# Patient Record
Sex: FEMALE | Race: WHITE | Employment: FULL TIME | URBAN - METROPOLITAN AREA
[De-identification: names, ages, dates, MRNs, and addresses within clinical notes are randomized per-mention and may not be internally consistent; named-entity substitution may affect disease eponyms.]

---

## 2022-01-18 ENCOUNTER — OFFICE VISIT (OUTPATIENT)
Dept: PRIMARY CARE CLINIC | Age: 22
End: 2022-01-18
Payer: COMMERCIAL

## 2022-01-18 VITALS
DIASTOLIC BLOOD PRESSURE: 105 MMHG | BODY MASS INDEX: 23.1 KG/M2 | HEART RATE: 111 BPM | SYSTOLIC BLOOD PRESSURE: 156 MMHG | WEIGHT: 114.6 LBS | RESPIRATION RATE: 16 BRPM | TEMPERATURE: 99.3 F | HEIGHT: 59 IN

## 2022-01-18 DIAGNOSIS — R51.9 NONINTRACTABLE HEADACHE, UNSPECIFIED CHRONICITY PATTERN, UNSPECIFIED HEADACHE TYPE: ICD-10-CM

## 2022-01-18 DIAGNOSIS — Z23 NEED FOR HPV VACCINATION: ICD-10-CM

## 2022-01-18 DIAGNOSIS — Z86.39 HISTORY OF VITAMIN D DEFICIENCY: ICD-10-CM

## 2022-01-18 DIAGNOSIS — Z12.4 CERVICAL CANCER SCREENING: ICD-10-CM

## 2022-01-18 DIAGNOSIS — Z76.89 ENCOUNTER TO ESTABLISH CARE: Primary | ICD-10-CM

## 2022-01-18 DIAGNOSIS — Z83.49 FAMILY HISTORY OF THYROID DISEASE: ICD-10-CM

## 2022-01-18 DIAGNOSIS — R03.0 ELEVATED BP WITHOUT DIAGNOSIS OF HYPERTENSION: ICD-10-CM

## 2022-01-18 PROCEDURE — 1036F TOBACCO NON-USER: CPT | Performed by: FAMILY MEDICINE

## 2022-01-18 PROCEDURE — 90651 9VHPV VACCINE 2/3 DOSE IM: CPT | Performed by: FAMILY MEDICINE

## 2022-01-18 PROCEDURE — G8427 DOCREV CUR MEDS BY ELIG CLIN: HCPCS | Performed by: FAMILY MEDICINE

## 2022-01-18 PROCEDURE — G8420 CALC BMI NORM PARAMETERS: HCPCS | Performed by: FAMILY MEDICINE

## 2022-01-18 PROCEDURE — 99203 OFFICE O/P NEW LOW 30 MIN: CPT | Performed by: FAMILY MEDICINE

## 2022-01-18 PROCEDURE — 90471 IMMUNIZATION ADMIN: CPT | Performed by: FAMILY MEDICINE

## 2022-01-18 PROCEDURE — G8484 FLU IMMUNIZE NO ADMIN: HCPCS | Performed by: FAMILY MEDICINE

## 2022-01-18 RX ORDER — DESOGESTREL AND ETHINYL ESTRADIOL 21-5 (28)
1 KIT ORAL DAILY
COMMUNITY
End: 2022-08-18

## 2022-01-18 ASSESSMENT — PATIENT HEALTH QUESTIONNAIRE - PHQ9
SUM OF ALL RESPONSES TO PHQ9 QUESTIONS 1 & 2: 0
SUM OF ALL RESPONSES TO PHQ QUESTIONS 1-9: 0
SUM OF ALL RESPONSES TO PHQ QUESTIONS 1-9: 0
1. LITTLE INTEREST OR PLEASURE IN DOING THINGS: 0
SUM OF ALL RESPONSES TO PHQ QUESTIONS 1-9: 0
2. FEELING DOWN, DEPRESSED OR HOPELESS: 0
SUM OF ALL RESPONSES TO PHQ QUESTIONS 1-9: 0

## 2022-01-18 ASSESSMENT — ENCOUNTER SYMPTOMS
SHORTNESS OF BREATH: 0
SINUS PRESSURE: 0
BACK PAIN: 0
SINUS PAIN: 0
BLOOD IN STOOL: 0
COUGH: 0
CHEST TIGHTNESS: 0
ABDOMINAL PAIN: 0
RHINORRHEA: 0
DIARRHEA: 0
SORE THROAT: 0
WHEEZING: 0
CONSTIPATION: 0
NAUSEA: 0
VOMITING: 0

## 2022-01-18 NOTE — PROGRESS NOTES
Chief Complaint   Patient presents with   [address]     would like labs dont thyroid and Vit D     Referral - General     GYN         ASSESSMENT/PLAN:  1. Encounter to establish care  VS reviewed     BMI reviewed   All questions answered. F/u discussed. Appropriate healthy lifestyle modifications discussed. - CBC Auto Differential; Future  - Comprehensive Metabolic Panel; Future    2. Family history of thyroid disease  Will update thyroid function tests and follow up accordingly   - TSH with Reflex; Future    3. History of vitamin D deficiency  Update, follow up and treat accordingly  - Vitamin D 25 Hydroxy; Future    4. Cervical cancer screening  Referral placed. - Evsin Banks MD, Gynecology, [facility]    5. Nonintractable headache, unspecified chronicity pattern, unspecified headache type  Odd in the setting of good nutrition, hydration. No trigger that she knows of,   Happens at rest or when she is relaxed  Usually resolve on own. May throb but encompasses forehead, BL. Follow up if returns. Shared decision to monitor - briefly discussed imaging, medications and conservative measures to work up/treat. 6. Elevated BP without diagnosis of hypertension  Will follow up in 4 weeks  May be causing headaches as above  If still elevated, may try to get some home recordings to decide whether or not to treat. Rule out thyroid abnormality. 7. Need for HPV vaccination  Administered. - HPV Vaccine 9-valent IM         HPI:  Radha Pfeiffer is a 19 y.o. (: 2000) here today to establish care. Family history of thyroid disease and low vitamin D. Would like both of these checked    Would like ref to gyn for cerv can screening. Last 4 months, shorter periods, about 2 days and very light. \"Brain freeze\" headache in forehead and felt like it was throbbing a little bit. Started a few weeks ago and happened a couple times a day, lasted 30 minutes and resolved.    Just chilling. No associated symptoms. States that her BP is usually high in doctors offices. No associated symptoms at this time. Would like to update her HPV vaccine. Review of Systems   Constitutional: Negative for activity change, appetite change, chills, fatigue, fever and unexpected weight change. HENT: Negative for congestion, postnasal drip, rhinorrhea, sinus pressure, sinus pain, sneezing and sore throat. Eyes: Negative for visual disturbance. Respiratory: Negative for cough, chest tightness, shortness of breath and wheezing. Cardiovascular: Negative for chest pain and palpitations. Gastrointestinal: Negative for abdominal pain, blood in stool, constipation, diarrhea, nausea and vomiting. Endocrine: Negative for cold intolerance, heat intolerance, polydipsia and polyuria. Genitourinary: Negative for dysuria, frequency, vaginal bleeding and vaginal discharge. Musculoskeletal: Negative for arthralgias, back pain, joint swelling, myalgias and neck pain. Skin: Negative for rash and wound. Allergic/Immunologic: Negative for environmental allergies. Neurological: Negative for dizziness, tremors, syncope, weakness, light-headedness, numbness and headaches. Hematological: Negative for adenopathy. Psychiatric/Behavioral: Negative for behavioral problems, decreased concentration, sleep disturbance and suicidal ideas. The patient is not nervous/anxious. History reviewed. No pertinent past medical history.     Family History   Problem Relation Age of Onset    Hyperthyroidism Mother     Diabetes Maternal Grandmother     Diabetes Maternal Grandfather     Diabetes Paternal Grandmother     Diabetes Paternal Grandfather        Social History     Tobacco Use    Smoking status: Never Smoker    Smokeless tobacco: Never Used   Substance Use Topics    Alcohol use: Not on file    Drug use: Not on file       New Prescriptions    No medications on file       Meds Prior to visit:  Current Outpatient Medications on File Prior to Visit   Medication Sig Dispense Refill    desogestrel-ethinyl estradiol (KARIVA) 0.15-0.02/0.01 MG (21/5) per tablet Take 1 tablet by mouth daily       No current facility-administered medications on file prior to visit. No Known Allergies    OBJECTIVE:  BP (!) 156/105   Pulse 111   Temp 99.3 °F (37.4 °C) (Temporal)   Resp 16   Ht 4' 11\" (1.499 m)   Wt 114 lb 9.6 oz (52 kg)   LMP 12/18/2021   Breastfeeding No   BMI 23.15 kg/m²   BP Readings from Last 2 Encounters:   01/18/22 (!) 156/105     Wt Readings from Last 3 Encounters:   01/18/22 114 lb 9.6 oz (52 kg)       Physical Exam  Vitals reviewed. Constitutional:       General: She is not in acute distress. Appearance: She is well-developed. HENT:      Head: Normocephalic and atraumatic. Right Ear: Tympanic membrane, ear canal and external ear normal. There is no impacted cerumen. Left Ear: Tympanic membrane, ear canal and external ear normal. There is no impacted cerumen. Mouth/Throat:      Mouth: Mucous membranes are moist.      Pharynx: Oropharynx is clear. No oropharyngeal exudate or posterior oropharyngeal erythema. Eyes:      General: No scleral icterus. Right eye: No discharge. Left eye: No discharge. Conjunctiva/sclera: Conjunctivae normal.      Pupils: Pupils are equal, round, and reactive to light. Cardiovascular:      Rate and Rhythm: Normal rate and regular rhythm. Heart sounds: Normal heart sounds. No murmur heard. Comments: Radial and pedal pulses intact  Pulmonary:      Effort: Pulmonary effort is normal. No respiratory distress. Breath sounds: Normal breath sounds. No wheezing or rales. Chest:      Chest wall: No tenderness. Abdominal:      General: Bowel sounds are normal.      Palpations: Abdomen is soft. Tenderness: There is no abdominal tenderness. There is no guarding. Comments: Normal liver and spleen. No organomegaly   Musculoskeletal:         General: No tenderness. Normal range of motion. Cervical back: Normal range of motion and neck supple. Comments: Intact in all extremities   Lymphadenopathy:      Cervical: No cervical adenopathy. Skin:     General: Skin is warm. Findings: No erythema or rash. Neurological:      General: No focal deficit present. Mental Status: She is alert and oriented to person, place, and time. Mental status is at baseline. Motor: No weakness or abnormal muscle tone. Coordination: Coordination normal.      Gait: Gait normal.      Deep Tendon Reflexes: Reflexes normal.   Psychiatric:         Mood and Affect: Mood normal.         Behavior: Behavior normal.         Thought Content: Thought content normal.         Judgment: Judgment normal.         Discussed use, benefit, and side effects of prescribed medications. Barriers to medication compliance addressed. All patient questions answered. Pt voiced understanding. RTC Return if symptoms worsen or fail to improve.     Future Appointments   Date Time Provider Seble Massey   2/18/2022  8:00 AM MD Radha Ross MD  1/18/2022  5:18 PM

## 2022-01-21 DIAGNOSIS — Z86.39 HISTORY OF VITAMIN D DEFICIENCY: Primary | ICD-10-CM

## 2022-01-21 DIAGNOSIS — Z76.89 ENCOUNTER TO ESTABLISH CARE: ICD-10-CM

## 2022-01-21 DIAGNOSIS — Z83.49 FAMILY HISTORY OF THYROID DISEASE: ICD-10-CM

## 2022-01-21 DIAGNOSIS — Z86.39 HISTORY OF VITAMIN D DEFICIENCY: ICD-10-CM

## 2022-01-21 LAB
A/G RATIO: 1.7 (ref 1.1–2.2)
ALBUMIN SERPL-MCNC: 4.5 G/DL (ref 3.4–5)
ALP BLD-CCNC: 52 U/L (ref 40–129)
ALT SERPL-CCNC: 7 U/L (ref 10–40)
ANION GAP SERPL CALCULATED.3IONS-SCNC: 14 MMOL/L (ref 3–16)
AST SERPL-CCNC: 19 U/L (ref 15–37)
BASOPHILS ABSOLUTE: 0 K/UL (ref 0–0.2)
BASOPHILS RELATIVE PERCENT: 0.5 %
BILIRUB SERPL-MCNC: 0.3 MG/DL (ref 0–1)
BUN BLDV-MCNC: 10 MG/DL (ref 7–20)
CALCIUM SERPL-MCNC: 9.8 MG/DL (ref 8.3–10.6)
CHLORIDE BLD-SCNC: 105 MMOL/L (ref 99–110)
CO2: 23 MMOL/L (ref 21–32)
CREAT SERPL-MCNC: 0.6 MG/DL (ref 0.6–1.1)
EOSINOPHILS ABSOLUTE: 0.1 K/UL (ref 0–0.6)
EOSINOPHILS RELATIVE PERCENT: 2.5 %
GFR AFRICAN AMERICAN: >60
GFR NON-AFRICAN AMERICAN: >60
GLUCOSE BLD-MCNC: 89 MG/DL (ref 70–99)
HCT VFR BLD CALC: 39.2 % (ref 36–48)
HEMOGLOBIN: 13.2 G/DL (ref 12–16)
LYMPHOCYTES ABSOLUTE: 1.7 K/UL (ref 1–5.1)
LYMPHOCYTES RELATIVE PERCENT: 32.6 %
MCH RBC QN AUTO: 29.9 PG (ref 26–34)
MCHC RBC AUTO-ENTMCNC: 33.7 G/DL (ref 31–36)
MCV RBC AUTO: 88.7 FL (ref 80–100)
MONOCYTES ABSOLUTE: 0.5 K/UL (ref 0–1.3)
MONOCYTES RELATIVE PERCENT: 9 %
NEUTROPHILS ABSOLUTE: 2.9 K/UL (ref 1.7–7.7)
NEUTROPHILS RELATIVE PERCENT: 55.4 %
PDW BLD-RTO: 13.1 % (ref 12.4–15.4)
PLATELET # BLD: 371 K/UL (ref 135–450)
PMV BLD AUTO: 7 FL (ref 5–10.5)
POTASSIUM SERPL-SCNC: 4.4 MMOL/L (ref 3.5–5.1)
RBC # BLD: 4.42 M/UL (ref 4–5.2)
SODIUM BLD-SCNC: 142 MMOL/L (ref 136–145)
TOTAL PROTEIN: 7.1 G/DL (ref 6.4–8.2)
TSH REFLEX: 2.43 UIU/ML (ref 0.27–4.2)
VITAMIN D 25-HYDROXY: 26.4 NG/ML
WBC # BLD: 5.2 K/UL (ref 4–11)

## 2022-01-21 RX ORDER — MELATONIN
1000 DAILY
Qty: 90 TABLET | Refills: 1 | Status: SHIPPED | OUTPATIENT
Start: 2022-01-21

## 2022-02-02 ENCOUNTER — OFFICE VISIT (OUTPATIENT)
Dept: PRIMARY CARE CLINIC | Age: 22
End: 2022-02-02
Payer: COMMERCIAL

## 2022-02-02 VITALS
HEART RATE: 117 BPM | DIASTOLIC BLOOD PRESSURE: 89 MMHG | BODY MASS INDEX: 23.1 KG/M2 | HEIGHT: 59 IN | SYSTOLIC BLOOD PRESSURE: 152 MMHG | RESPIRATION RATE: 16 BRPM | TEMPERATURE: 98.2 F | WEIGHT: 114.6 LBS

## 2022-02-02 DIAGNOSIS — R51.9 NONINTRACTABLE HEADACHE, UNSPECIFIED CHRONICITY PATTERN, UNSPECIFIED HEADACHE TYPE: ICD-10-CM

## 2022-02-02 DIAGNOSIS — I10 BENIGN ESSENTIAL HTN: Primary | ICD-10-CM

## 2022-02-02 PROCEDURE — G8427 DOCREV CUR MEDS BY ELIG CLIN: HCPCS | Performed by: FAMILY MEDICINE

## 2022-02-02 PROCEDURE — 99214 OFFICE O/P EST MOD 30 MIN: CPT | Performed by: FAMILY MEDICINE

## 2022-02-02 PROCEDURE — G8420 CALC BMI NORM PARAMETERS: HCPCS | Performed by: FAMILY MEDICINE

## 2022-02-02 PROCEDURE — 1036F TOBACCO NON-USER: CPT | Performed by: FAMILY MEDICINE

## 2022-02-02 PROCEDURE — G8484 FLU IMMUNIZE NO ADMIN: HCPCS | Performed by: FAMILY MEDICINE

## 2022-02-02 RX ORDER — AMLODIPINE BESYLATE 5 MG/1
5 TABLET ORAL DAILY
Qty: 30 TABLET | Refills: 3 | Status: SHIPPED | OUTPATIENT
Start: 2022-02-02 | End: 2022-02-07 | Stop reason: SDUPTHER

## 2022-02-02 ASSESSMENT — ENCOUNTER SYMPTOMS
COUGH: 0
CONSTIPATION: 0
DIARRHEA: 0
EYE PAIN: 0
SHORTNESS OF BREATH: 0
ABDOMINAL PAIN: 0

## 2022-02-02 NOTE — PROGRESS NOTES
Chief Complaint   Patient presents with   Eusebia Draft Establish Care         ASSESSMENT/PLAN:  1. Benign essential HTN  Will start amlodipine 5 mg daily  Follow up in 2-4 weeks to gauge improvement  Labs reviewed and WNL  Denies CP or pressure  Will consider ECHO in the near future if possible murmur still heard on physical at follow up  Provided dash diet info  - amLODIPine (NORVASC) 5 MG tablet; Take 1 tablet by mouth daily  Dispense: 30 tablet; Refill: 3    2. Nonintractable headache, unspecified chronicity pattern, unspecified headache type  See if antihypertensive is going to help headaches given elevated Bps in and out of office. Stress level controlled  Follow up in 2-4 weeks to gauge improvement with BP mgmt. HPI:  Shannan Badillo is a 19 y.o. (: 2000) here today for blood pressure management and headache discussion. Has checked BP in pharmacy and it is 150s-160s/90s-100s. No meds. No change in diet but already does eat healthily and exercise. Headaches are almost daily and last for about 1-2 hours. Back of head. No throbbing or pulsing. No hearing or vision changes. Has not tried any meds. Maintains good hydration. Review of Systems   Constitutional: Negative for appetite change, chills, fatigue and fever. HENT: Negative for congestion. Eyes: Negative for pain and visual disturbance. Respiratory: Negative for cough and shortness of breath. Cardiovascular: Negative for chest pain and palpitations. Gastrointestinal: Negative for abdominal pain, constipation and diarrhea. Genitourinary: Negative for difficulty urinating. Musculoskeletal: Negative for arthralgias. Skin: Negative for rash and wound. Neurological: Positive for headaches. Negative for dizziness, weakness and light-headedness. Back of head, tension like. No pulsing. Hematological: Does not bruise/bleed easily. Psychiatric/Behavioral: Negative for behavioral problems. History reviewed.  No pertinent past medical history. Family History   Problem Relation Age of Onset    Hyperthyroidism Mother     Diabetes Maternal Grandmother     Diabetes Maternal Grandfather     Diabetes Paternal Grandmother     Diabetes Paternal Grandfather        Social History     Tobacco Use    Smoking status: Never Smoker    Smokeless tobacco: Never Used   Substance Use Topics    Alcohol use: Not on file    Drug use: Not on file       New Prescriptions    AMLODIPINE (NORVASC) 5 MG TABLET    Take 1 tablet by mouth daily       Meds Prior to visit:  Current Outpatient Medications on File Prior to Visit   Medication Sig Dispense Refill    vitamin D3 (CHOLECALCIFEROL) 25 MCG (1000 UT) TABS tablet Take 1 tablet by mouth daily 90 tablet 1    desogestrel-ethinyl estradiol (KARIVA) 0.15-0.02/0.01 MG (21/5) per tablet Take 1 tablet by mouth daily       No current facility-administered medications on file prior to visit. No Known Allergies    OBJECTIVE:  BP (!) 152/89   Pulse 117   Temp 98.2 °F (36.8 °C) (Temporal)   Resp 16   Ht 4' 11\" (1.499 m)   Wt 114 lb 9.6 oz (52 kg)   LMP 01/12/2022   Breastfeeding No   BMI 23.15 kg/m²   BP Readings from Last 2 Encounters:   02/02/22 (!) 152/89   01/18/22 (!) 156/105     Wt Readings from Last 3 Encounters:   02/02/22 114 lb 9.6 oz (52 kg)   01/18/22 114 lb 9.6 oz (52 kg)       Physical Exam  Vitals reviewed. Constitutional:       General: She is not in acute distress. Appearance: She is well-developed. HENT:      Head: Normocephalic and atraumatic. Right Ear: Tympanic membrane, ear canal and external ear normal. There is no impacted cerumen. Left Ear: Tympanic membrane, ear canal and external ear normal. There is no impacted cerumen. Mouth/Throat:      Mouth: Mucous membranes are moist.      Pharynx: Oropharynx is clear. No oropharyngeal exudate or posterior oropharyngeal erythema. Eyes:      General: No scleral icterus.         Right eye: No discharge. Left eye: No discharge. Conjunctiva/sclera: Conjunctivae normal.      Pupils: Pupils are equal, round, and reactive to light. Cardiovascular:      Rate and Rhythm: Normal rate and regular rhythm. Pulses: Normal pulses. Heart sounds: Murmur heard. Comments: Radial and pedal pulses intact. May be a systolic murmur but hard to discern. Pulmonary:      Effort: Pulmonary effort is normal. No respiratory distress. Breath sounds: Normal breath sounds. No wheezing or rales. Chest:      Chest wall: No tenderness. Abdominal:      General: Bowel sounds are normal.      Palpations: Abdomen is soft. Tenderness: There is no abdominal tenderness. There is no guarding. Comments: Normal liver and spleen. No organomegaly   Musculoskeletal:         General: No tenderness. Normal range of motion. Cervical back: Normal range of motion and neck supple. Comments: Intact in all extremities   Lymphadenopathy:      Cervical: No cervical adenopathy. Skin:     General: Skin is warm. Findings: No erythema or rash. Neurological:      General: No focal deficit present. Mental Status: She is alert and oriented to person, place, and time. Mental status is at baseline. Motor: No weakness or abnormal muscle tone. Coordination: Coordination normal.      Gait: Gait normal.      Deep Tendon Reflexes: Reflexes normal.   Psychiatric:         Mood and Affect: Mood normal.         Behavior: Behavior normal.         Thought Content:  Thought content normal.         Judgment: Judgment normal.         Lab Results   Component Value Date    WBC 5.2 01/21/2022    HGB 13.2 01/21/2022    HCT 39.2 01/21/2022    MCV 88.7 01/21/2022     01/21/2022     Lab Results   Component Value Date     01/21/2022    K 4.4 01/21/2022     01/21/2022    CO2 23 01/21/2022    BUN 10 01/21/2022    CREATININE 0.6 01/21/2022    GLUCOSE 89 01/21/2022    CALCIUM 9.8 01/21/2022    PROT 7.1 01/21/2022    LABALBU 4.5 01/21/2022    BILITOT 0.3 01/21/2022    ALKPHOS 52 01/21/2022    AST 19 01/21/2022    ALT 7 (L) 01/21/2022    LABGLOM >60 01/21/2022    GFRAA >60 01/21/2022    AGRATIO 1.7 01/21/2022       Discussed use, benefit, and side effects of prescribed medications. Barriers to medication compliance addressed. All patient questions answered. Pt voiced understanding. RTC Return in about 4 weeks (around 3/2/2022).     Future Appointments   Date Time Provider Seble Massey   2/18/2022  8:00 AM MD Davian Rocha MD  2/2/2022  10:14 AM

## 2022-02-02 NOTE — PATIENT INSTRUCTIONS
A serving is 1 slice of bread, 1 ounce of dry cereal, or ½ cup of cooked rice, pasta, or cooked cereal. Try to choose whole-grain products as much as possible. · Limit lean meat, poultry, and fish to 2 servings each day. A serving is 3 ounces, about the size of a deck of cards. · Eat 4 to 5 servings of nuts, seeds, and legumes (cooked dried beans, lentils, and split peas) each week. A serving is 1/3 cup of nuts, 2 tablespoons of seeds, or ½ cup of cooked beans or peas. · Limit fats and oils to 2 to 3 servings each day. A serving is 1 teaspoon of vegetable oil or 2 tablespoons of salad dressing. · Limit sweets and added sugars to 5 servings or less a week. A serving is 1 tablespoon jelly or jam, ½ cup sorbet, or 1 cup of lemonade. · Eat less than 2,300 milligrams (mg) of sodium a day. If you limit your sodium to 1,500 mg a day, you can lower your blood pressure even more. · Be aware that all of these are the suggested number of servings for people who eat 1,800 to 2,000 calories a day. Your recommended number of servings may be different if you need more or fewer calories. Tips for success  · Start small. Do not try to make dramatic changes to your diet all at once. You might feel that you are missing out on your favorite foods and then be more likely to not follow the plan. Make small changes, and stick with them. Once those changes become habit, add a few more changes. · Try some of the following:  ? Make it a goal to eat a fruit or vegetable at every meal and at snacks. This will make it easy to get the recommended amount of fruits and vegetables each day. ? Try yogurt topped with fruit and nuts for a snack or healthy dessert. ? Add lettuce, tomato, cucumber, and onion to sandwiches. ? Combine a ready-made pizza crust with low-fat mozzarella cheese and lots of vegetable toppings. Try using tomatoes, squash, spinach, broccoli, carrots, cauliflower, and onions. ?  Have a variety of cut-up vegetables with a low-fat dip as an appetizer instead of chips and dip. ? Sprinkle sunflower seeds or chopped almonds over salads. Or try adding chopped walnuts or almonds to cooked vegetables. ? Try some vegetarian meals using beans and peas. Add garbanzo or kidney beans to salads. Make burritos and tacos with mashed lee beans or black beans. Where can you learn more? Go to https://[employer]. org and sign in to your [employer] account. Enter P203 in the [employer] box to learn more about \"DASH Diet: Care Instructions. \"     If you do not have an account, please click on the \"Sign Up Now\" link. Current as of: April 29, 2021               Content Version: 13.1  © 6039-4272 [employer]. Care instructions adapted under license by [facility] ([facility]). If you have questions about a medical condition or this instruction, always ask your healthcare professional. [address] any warranty or liability for your use of this information.

## 2022-02-06 ENCOUNTER — HOSPITAL ENCOUNTER (EMERGENCY)
Age: 22
Discharge: HOME | End: 2022-02-06
Attending: EMERGENCY MEDICINE
Payer: COMMERCIAL

## 2022-02-06 VITALS
RESPIRATION RATE: 16 BRPM | TEMPERATURE: 98.4 F | HEART RATE: 87 BPM | HEIGHT: 59 IN | WEIGHT: 114.2 LBS | SYSTOLIC BLOOD PRESSURE: 157 MMHG | OXYGEN SATURATION: 100 % | BODY MASS INDEX: 23.02 KG/M2 | DIASTOLIC BLOOD PRESSURE: 101 MMHG

## 2022-02-06 DIAGNOSIS — T50.905A ADVERSE EFFECT OF DRUG, INITIAL ENCOUNTER: ICD-10-CM

## 2022-02-06 DIAGNOSIS — R00.2 PALPITATIONS: Primary | ICD-10-CM

## 2022-02-06 LAB
EKG ATRIAL RATE: 110 BPM
EKG DIAGNOSIS: NORMAL
EKG P AXIS: 62 DEGREES
EKG P-R INTERVAL: 152 MS
EKG Q-T INTERVAL: 330 MS
EKG QRS DURATION: 70 MS
EKG QTC CALCULATION (BAZETT): 446 MS
EKG R AXIS: 12 DEGREES
EKG T AXIS: 20 DEGREES
EKG VENTRICULAR RATE: 110 BPM

## 2022-02-06 PROCEDURE — 6370000000 HC RX 637 (ALT 250 FOR IP): Performed by: EMERGENCY MEDICINE

## 2022-02-06 PROCEDURE — 93005 ELECTROCARDIOGRAM TRACING: CPT | Performed by: EMERGENCY MEDICINE

## 2022-02-06 PROCEDURE — 99283 EMERGENCY DEPT VISIT LOW MDM: CPT

## 2022-02-06 PROCEDURE — 93010 ELECTROCARDIOGRAM REPORT: CPT | Performed by: INTERNAL MEDICINE

## 2022-02-06 RX ORDER — DIPHENHYDRAMINE HCL 25 MG
25 CAPSULE ORAL EVERY 4 HOURS PRN
Qty: 25 CAPSULE | Refills: 0 | Status: SHIPPED | OUTPATIENT
Start: 2022-02-06 | End: 2022-02-16

## 2022-02-06 RX ORDER — DIPHENHYDRAMINE HCL 25 MG
50 TABLET ORAL ONCE
Status: COMPLETED | OUTPATIENT
Start: 2022-02-06 | End: 2022-02-06

## 2022-02-06 RX ORDER — FAMOTIDINE 20 MG/1
20 TABLET, FILM COATED ORAL 2 TIMES DAILY
Qty: 60 TABLET | Refills: 0 | Status: SHIPPED | OUTPATIENT
Start: 2022-02-06 | End: 2022-08-18

## 2022-02-06 RX ORDER — FAMOTIDINE 20 MG/1
20 TABLET, FILM COATED ORAL ONCE
Status: COMPLETED | OUTPATIENT
Start: 2022-02-06 | End: 2022-02-06

## 2022-02-06 RX ADMIN — FAMOTIDINE 20 MG: 20 TABLET ORAL at 00:41

## 2022-02-06 RX ADMIN — DIPHENHYDRAMINE HYDROCHLORIDE 50 MG: 25 TABLET ORAL at 00:41

## 2022-02-06 NOTE — ED NOTES
Patient prepared for and ready to be discharged. Patient discharged at this time in no acute distress after verbalizing understanding of discharge instructions. Patient left after receiving After Visit Summary instructions.         Sindy Kovacs RN  02/06/22 [id number]

## 2022-02-07 DIAGNOSIS — I10 BENIGN ESSENTIAL HTN: ICD-10-CM

## 2022-02-07 RX ORDER — AMLODIPINE BESYLATE 5 MG/1
5 TABLET ORAL DAILY
Qty: 30 TABLET | Refills: 3 | Status: SHIPPED | OUTPATIENT
Start: 2022-02-07 | End: 2022-02-08

## 2022-02-07 ASSESSMENT — ENCOUNTER SYMPTOMS
ABDOMINAL PAIN: 0
SHORTNESS OF BREATH: 0
COUGH: 0
PHOTOPHOBIA: 0
COLOR CHANGE: 0
VOMITING: 0
TROUBLE SWALLOWING: 0
NAUSEA: 0

## 2022-02-07 NOTE — ED PROVIDER NOTES
[facility]  EMERGENCY DEPARTMENTENCOUNTER      Pt Name: Jamee Burris  MRN: [id number]  [place] 2000  Date ofevaluation: 2/6/2022  Provider: Shelli Rincon MD    CHIEF COMPLAINT       Chief Complaint   Patient presents with    Arm Pain    Tachycardia         HISTORY OF PRESENT ILLNESS   (Location/Symptom, Timing/Onset,Context/Setting, Quality, Duration, Modifying Factors, Severity)  Note limiting factors. Jamee Burris is a 23 y.o. female  who  has a past medical history of Hypertension. who presents to the emergency department valuation of palpitations and a tingling sensation of the left arm. Patient reports acute onset of symptoms shortly prior to arrival.  Patient reports he had similar symptoms in the past that typically resolve. She reports that she recently was started on amlodipine for treatment of high blood pressure. States that she began taking the medication today. Denies shortness of breath or chest pains. Denies pain or swelling to lower extremities. Denies loss of sensation or weakness in the extremities. Denies headache or changes in vision. HPI    NursingNotes were reviewed. REVIEW OF SYSTEMS    (2-9 systems for level 4, 10 or more for level 5)     Review of Systems   Constitutional: Negative for activity change, fatigue and fever. HENT: Negative for congestion, mouth sores and trouble swallowing. Eyes: Negative for photophobia and visual disturbance. Respiratory: Negative for cough and shortness of breath. Cardiovascular: Positive for palpitations. Negative for chest pain. Gastrointestinal: Negative for abdominal pain, nausea and vomiting. Genitourinary: Negative for difficulty urinating and frequency. Musculoskeletal: Negative for gait problem and neck pain. Skin: Negative for color change and rash. Neurological: Negative for dizziness, facial asymmetry, weakness, light-headedness, numbness and headaches.    Psychiatric/Behavioral: Negative for confusion. The patient is not nervous/anxious. All other systems reviewed and are negative. Except as noted above the remainder of the review of systems was reviewed and negative. PAST MEDICAL HISTORY     Past Medical History:   Diagnosis Date    Hypertension          SURGICALHISTORY       Past Surgical History:   Procedure Laterality Date    ARM SURGERY Left 2007    SEPTOPLASTY  2016         CURRENT MEDICATIONS       Discharge Medication List as of 2/6/2022  1:48 AM      CONTINUE these medications which have NOT CHANGED    Details   amLODIPine (NORVASC) 5 MG tablet Take 1 tablet by mouth daily, Disp-30 tablet, R-3Normal      vitamin D3 (CHOLECALCIFEROL) 25 MCG (1000 UT) TABS tablet Take 1 tablet by mouth daily, Disp-90 tablet, R-1Normal      desogestrel-ethinyl estradiol (KARIVA) 0.15-0.02/0.01 MG (21/5) per tablet Take 1 tablet by mouth dailyHistorical Med                  Patient has no known allergies. FAMILY HISTORY       Family History   Problem Relation Age of Onset    Hyperthyroidism Mother     Diabetes Maternal Grandmother     Diabetes Maternal Grandfather     Diabetes Paternal Grandmother     Diabetes Paternal Grandfather           SOCIAL HISTORY       Social History     Socioeconomic History    Marital status: Single     Spouse name: None    Number of children: None    Years of education: None    Highest education level: None   Occupational History    None   Tobacco Use    Smoking status: Never Smoker    Smokeless tobacco: Never Used   Substance and Sexual Activity    Alcohol use:  Yes    Drug use: None    Sexual activity: Yes     Partners: Male   Other Topics Concern    None   Social History Narrative    None     Social Determinants of Health     Financial Resource Strain:     Difficulty of Paying Living Expenses: Not on file   Food Insecurity:     Worried About Running Out of Food in the Last Year: Not on file    Jaz of Food in the Last Year: Not on file   Transportation Needs:     Lack of Transportation (Medical): Not on file    Lack of Transportation (Non-Medical): Not on file   Physical Activity:     Days of Exercise per Week: Not on file    Minutes of Exercise per Session: Not on file   Stress:     Feeling of Stress : Not on file   Social Connections:     Frequency of Communication with Friends and Family: Not on file    Frequency of Social Gatherings with Friends and Family: Not on file    Attends Denominational Services: Not on file    Active Member of [address] [place] [address] [employer] or Organizations: Not on file    Attends Club or Organization Meetings: Not on file    Marital Status: Not on file   Intimate Partner Violence:     Fear of Current or Ex-Partner: Not on file    Emotionally Abused: Not on file    Physically Abused: Not on file    Sexually Abused: Not on file   Housing Stability:     Unable to Pay for Housing in the Last Year: Not on file    Number of [place] in the Last Year: Not on file    Unstable Housing in the Last Year: Not on file       SCREENINGS             PHYSICAL EXAM    (up to 7 for level 4, 8 or more for level 5)     ED Triage Vitals [02/06/22 0018]   BP Temp Temp Source Pulse Resp SpO2 Height Weight   (!) 157/101 98.4 °F (36.9 °C) Oral 124 16 100 % 4' 11\" (1.499 m) 114 lb 3.2 oz (51.8 kg)       Physical Exam  Vitals and nursing note reviewed. Constitutional:       Appearance: She is well-developed. HENT:      Head: Normocephalic and atraumatic. Mouth/Throat:      Mouth: Mucous membranes are moist.      Pharynx: Oropharynx is clear. Eyes:      Extraocular Movements: Extraocular movements intact. Conjunctiva/sclera: Conjunctivae normal.      Pupils: Pupils are equal, round, and reactive to light. Neck:      Trachea: No tracheal deviation. Cardiovascular:      Rate and Rhythm: Regular rhythm. Tachycardia present. Pulses: Normal pulses. Heart sounds: Normal heart sounds.    Pulmonary:      Effort: Pulmonary effort is normal.      Breath sounds: Normal breath sounds. Abdominal:      General: There is no distension. Palpations: Abdomen is soft. Tenderness: There is no abdominal tenderness. Musculoskeletal:         General: Normal range of motion. Cervical back: Normal range of motion. Skin:     General: Skin is warm and dry. Capillary Refill: Capillary refill takes less than 2 seconds. Neurological:      General: No focal deficit present. Mental Status: She is alert and oriented to person, place, and time. Mental status is at baseline. Sensory: No sensory deficit. Motor: No weakness. Coordination: Coordination normal.      Gait: Gait normal.         RESULTS     EKG: All EKG's are interpreted by the Emergency Department Physician who either signs or Co-signsthis chart in the absence of a cardiologist.    EKG shows a sinus rhythm ventricular 110 bpm.  NE interval and QTc interval within normal limits. QRS duration within normal limits. Patient has normal axis. There are no significant ST elevations or depressions EKG is nondiagnostic for ACS. Yusuf Patrickr No previous EGD to compare to. RADIOLOGY:   Non-plain filmimages such as CT, Ultrasound and MRI are read by the radiologist. Plain radiographic images are visualized and preliminarily interpreted by the emergency physician with the below findings:      Interpretation per the Radiologist below, if available at the time ofthis note:    No orders to display         ED BEDSIDE ULTRASOUND:   Performed by ED Physician - none    LABS:  Labs Reviewed - No data to display    All other labs were within normal range or not returned as of this dictation.     EMERGENCY DEPARTMENT COURSE and DIFFERENTIAL DIAGNOSIS/MDM:   Vitals:    Vitals:    02/06/22 0018 02/06/22 0141   BP: (!) 157/101    Pulse: 124 87   Resp: 16    Temp: 98.4 °F (36.9 °C)    TempSrc: Oral    SpO2: 100%    Weight: 114 lb 3.2 oz (51.8 kg)    Height: 4' 11\" (1.499 m) Patient was given thefollowing medications:  Medications   famotidine (PEPCID) tablet 20 mg (20 mg Oral Given 2/6/22 0041)   diphenhydrAMINE (BENADRYL) tablet 50 mg (50 mg Oral Given 2/6/22 0041)       ED COURSE & MEDICAL DECISION MAKING    Pertinent Labs & Imaging studies reviewed. (See chart for details)   -  Patient seen and evaluated in the emergency department. -  Triage and nursing notes reviewed and incorporated. -  Old chart records reviewed and incorporated. -  Differential diagnosis includes: Differential Diagnosis: Anaphylaxis, Angioedema, Allergic dermatitis, Bacterial etiology, Fungal etiology, Urticaria, Erythema Multiforme, Anxiety, Cardiac arrhythmia, other    -  Work-up included:  See above  -  ED treatment included: See above  -  Results discussed with patient. 70-year-old female presents the ED for evaluation of palpitations. Patient is noted be tachycardic on arrival.  Heart rate improved without intervention. She has no sensory deficits. EKG nondiagnostic for ACS. Denies pain or swelling to lower extremities. Reviewing the lexicon on amlodipine palpitations and tachycardia are possible side effect of the medication. Given the patient's symptoms occurred after taking the medication I suspect this may be the etiology. Patient was given Pepcid and Benadryl in case there is an allergic reaction component. On reevaluation heart rate is improved into the 80s. She is asymptomatic. Patient instructed to refrain from taking the medication and follow-up with PCP. Patient feels improved on reevaluation. Symptomatic treatment with expectant management discussed with the patient and they and/or family members present are amenable to treatment plan and outpatient follow-up. Strict return precautions were discussed with the patient and those present. They demonstrated understanding of when to return to the emergency department for new or worsening symptoms. .  The patient is agreeable with plan of care and disposition. REASSESSMENT          CRITICAL CARE TIME   Total Critical Care time was 0 minutes, excluding separately reportable procedures. There was a high probability of clinically significant/life threatening deterioration in the patient's condition which required my urgent intervention. CONSULTS:  None    PROCEDURES:  Unless otherwise noted below, none     Procedures    FINAL IMPRESSION      1. Palpitations    2.  Adverse effect of drug, initial encounter          DISPOSITION/PLAN   DISPOSITION Decision To Discharge 02/06/2022 01:17:04 AM      PATIENT REFERREDTO:  Catalina Amezquita MD  [address]  [address] [facility] [place] [facility]  [phone]    Schedule an appointment as soon as possible for a visit   As needed      DISCHARGEMEDICATIONS:  Discharge Medication List as of 2/6/2022  1:48 AM      START taking these medications    Details   famotidine (PEPCID) 20 MG tablet Take 1 tablet by mouth 2 times daily, Disp-60 tablet, R-0Normal      diphenhydrAMINE (BENADRYL) 25 MG capsule Take 1 capsule by mouth every 4 hours as needed for Itching, Disp-25 capsule, R-0Normal                (Please note that portions of this note were completed with a voice recognition program.  Efforts were made to edit the dictations but occasionally words are mis-transcribed.)    Brittney Cameron MD (electronically signed)  Attending Emergency Physician          Brittney Cameron MD  02/07/22 1418

## 2022-02-07 NOTE — TELEPHONE ENCOUNTER
amLODIPine (NORVASC) 5 MG tablet     Pt had a reaction- it elevated her heart rate.  She said it was above 100 she went off of it and it calmed back down

## 2022-02-08 DIAGNOSIS — I10 BENIGN ESSENTIAL HTN: Primary | ICD-10-CM

## 2022-02-08 RX ORDER — LISINOPRIL 10 MG/1
10 TABLET ORAL DAILY
Qty: 90 TABLET | Refills: 1 | Status: SHIPPED | OUTPATIENT
Start: 2022-02-08 | End: 2022-08-04

## 2022-02-17 ENCOUNTER — OFFICE VISIT (OUTPATIENT)
Dept: PRIMARY CARE CLINIC | Age: 22
End: 2022-02-17
Payer: COMMERCIAL

## 2022-02-17 ENCOUNTER — PATIENT MESSAGE (OUTPATIENT)
Dept: PRIMARY CARE CLINIC | Age: 22
End: 2022-02-17

## 2022-02-17 VITALS
BODY MASS INDEX: 23.03 KG/M2 | SYSTOLIC BLOOD PRESSURE: 145 MMHG | HEART RATE: 106 BPM | TEMPERATURE: 97.7 F | WEIGHT: 114 LBS | DIASTOLIC BLOOD PRESSURE: 86 MMHG

## 2022-02-17 DIAGNOSIS — I10 BENIGN ESSENTIAL HTN: Primary | ICD-10-CM

## 2022-02-17 DIAGNOSIS — R29.898 LEG HEAVINESS: ICD-10-CM

## 2022-02-17 DIAGNOSIS — R29.898 LEG HEAVINESS: Primary | ICD-10-CM

## 2022-02-17 DIAGNOSIS — I10 HYPERTENSION, UNSPECIFIED TYPE: ICD-10-CM

## 2022-02-17 DIAGNOSIS — Z23 IMMUNIZATION DUE: ICD-10-CM

## 2022-02-17 PROCEDURE — 99214 OFFICE O/P EST MOD 30 MIN: CPT | Performed by: FAMILY MEDICINE

## 2022-02-17 PROCEDURE — 90471 IMMUNIZATION ADMIN: CPT | Performed by: FAMILY MEDICINE

## 2022-02-17 PROCEDURE — G8420 CALC BMI NORM PARAMETERS: HCPCS | Performed by: FAMILY MEDICINE

## 2022-02-17 PROCEDURE — G8484 FLU IMMUNIZE NO ADMIN: HCPCS | Performed by: FAMILY MEDICINE

## 2022-02-17 PROCEDURE — G8427 DOCREV CUR MEDS BY ELIG CLIN: HCPCS | Performed by: FAMILY MEDICINE

## 2022-02-17 PROCEDURE — 1036F TOBACCO NON-USER: CPT | Performed by: FAMILY MEDICINE

## 2022-02-17 PROCEDURE — 90649 4VHPV VACCINE 3 DOSE IM: CPT | Performed by: FAMILY MEDICINE

## 2022-02-17 ASSESSMENT — ENCOUNTER SYMPTOMS
DIARRHEA: 0
CONSTIPATION: 0
EYE PAIN: 0
SHORTNESS OF BREATH: 0
ABDOMINAL PAIN: 0
COUGH: 0

## 2022-02-17 NOTE — PROGRESS NOTES
Chief Complaint   Patient presents with    Immunizations     hpv    Blood Pressure Check         ASSESSMENT/PLAN:  1. Benign essential HTN  Better controlled in triage  Will continue this regimen  Discussed DASH  Follow up in 3 months to cont to monitor and titrate meds accordingly    2. Immunization due  administered  - HPV vaccine quadravalent IM    3. Leg heaviness  Rule out myositis vs myasthenia gravis - will ask patient if she is game for blood testing like CK and acetylcholine esterase inh  Thyroid normal  Electrolytes normal  Patellar and achilles reflexes normal         HPI:  Jason Grayson is a 23 y.o. (: 2000) here today for HTN monitoring after starting lisinopril    BP Readings from Last 3 Encounters:   22 (!) 145/86   22 (!) 157/101   22 (!) 152/89       Headaches are a bit better, less severe and frequent. No associated symptoms  Sometimes they happen near the base of skull and sometimes they are near forehead. Last for maybe an hour and then dissipate. Leg heaviness started in Aug 2021  No superficial rashes or skin changes. No accident, injury or new exercise  Leg muscles are fatigued or tire much fast than they used to. Sometimes she will experience muscle twitching  Reviewed electrolytes and tsh, wnl.       Review of Systems   Constitutional: Negative for appetite change, chills, fatigue and fever. HENT: Negative for congestion. Eyes: Negative for pain and visual disturbance. Respiratory: Negative for cough and shortness of breath. Cardiovascular: Negative for chest pain and palpitations. Gastrointestinal: Negative for abdominal pain, constipation and diarrhea. Genitourinary: Negative for difficulty urinating. Musculoskeletal: Negative for arthralgias. Skin: Negative for rash and wound. Neurological: Negative for dizziness, weakness, light-headedness and headaches. Hematological: Does not bruise/bleed easily.    Psychiatric/Behavioral: Negative for behavioral problems. Past Medical History:   Diagnosis Date    Hypertension        Family History   Problem Relation Age of Onset    Hyperthyroidism Mother     Diabetes Maternal Grandmother     Diabetes Maternal Grandfather     Diabetes Paternal Grandmother     Diabetes Paternal Grandfather        Social History     Tobacco Use    Smoking status: Never Smoker    Smokeless tobacco: Never Used   Substance Use Topics    Alcohol use: Yes    Drug use: Not on file       New Prescriptions    No medications on file       Meds Prior to visit:  Current Outpatient Medications on File Prior to Visit   Medication Sig Dispense Refill    lisinopril (PRINIVIL;ZESTRIL) 10 MG tablet Take 1 tablet by mouth daily 90 tablet 1    famotidine (PEPCID) 20 MG tablet Take 1 tablet by mouth 2 times daily 60 tablet 0    vitamin D3 (CHOLECALCIFEROL) 25 MCG (1000 UT) TABS tablet Take 1 tablet by mouth daily 90 tablet 1    desogestrel-ethinyl estradiol (KARIVA) 0.15-0.02/0.01 MG (21/5) per tablet Take 1 tablet by mouth daily       No current facility-administered medications on file prior to visit. No Known Allergies    OBJECTIVE:  BP (!) 145/86   Pulse 106   Temp 97.7 °F (36.5 °C) (Temporal)   Wt 114 lb (51.7 kg)   Breastfeeding No   BMI 23.03 kg/m²   BP Readings from Last 2 Encounters:   02/17/22 (!) 145/86   02/06/22 (!) 157/101     Wt Readings from Last 3 Encounters:   02/17/22 114 lb (51.7 kg)   02/06/22 114 lb 3.2 oz (51.8 kg)   02/02/22 114 lb 9.6 oz (52 kg)       Physical Exam  Vitals reviewed. Constitutional:       General: She is not in acute distress. Appearance: Normal appearance. She is normal weight. She is not ill-appearing. HENT:      Head: Normocephalic and atraumatic. Right Ear: External ear normal.      Left Ear: External ear normal.      Nose: Nose normal. No congestion. Mouth/Throat:      Mouth: Mucous membranes are moist.      Pharynx: Oropharynx is clear.  No oropharyngeal exudate. Eyes:      Extraocular Movements: Extraocular movements intact. Conjunctiva/sclera: Conjunctivae normal.      Pupils: Pupils are equal, round, and reactive to light. Cardiovascular:      Rate and Rhythm: Normal rate and regular rhythm. Pulses: Normal pulses. Heart sounds: Normal heart sounds. Pulmonary:      Effort: Pulmonary effort is normal. No respiratory distress. Breath sounds: Normal breath sounds. No stridor. No wheezing, rhonchi or rales. Abdominal:      General: Bowel sounds are normal.      Palpations: Abdomen is soft. Tenderness: There is no abdominal tenderness. Musculoskeletal:         General: No swelling, tenderness, deformity or signs of injury. Normal range of motion. Cervical back: Normal range of motion and neck supple. Right lower leg: No edema. Left lower leg: No edema. Skin:     General: Skin is warm. Capillary Refill: Capillary refill takes less than 2 seconds. Findings: No erythema or rash. Neurological:      General: No focal deficit present. Mental Status: She is alert and oriented to person, place, and time. Mental status is at baseline. Motor: No weakness. Coordination: Coordination normal.      Gait: Gait normal.      Deep Tendon Reflexes: Reflexes normal.   Psychiatric:         Mood and Affect: Mood normal.         Behavior: Behavior normal.         Thought Content:  Thought content normal.         Judgment: Judgment normal.         Lab Results   Component Value Date    WBC 5.2 01/21/2022    HGB 13.2 01/21/2022    HCT 39.2 01/21/2022    MCV 88.7 01/21/2022     01/21/2022     Lab Results   Component Value Date     01/21/2022    K 4.4 01/21/2022     01/21/2022    CO2 23 01/21/2022    BUN 10 01/21/2022    CREATININE 0.6 01/21/2022    GLUCOSE 89 01/21/2022    CALCIUM 9.8 01/21/2022    PROT 7.1 01/21/2022    LABALBU 4.5 01/21/2022    BILITOT 0.3 01/21/2022    ALKPHOS 52 01/21/2022    AST 19 01/21/2022    ALT 7 (L) 01/21/2022    LABGLOM >60 01/21/2022    GFRAA >60 01/21/2022    AGRATIO 1.7 01/21/2022       Discussed use, benefit, and side effects of prescribed medications. Barriers to medication compliance addressed. All patient questions answered. Pt voiced understanding. RTC Return in about 3 months (around 5/17/2022).     Future Appointments   Date Time Provider Seble Massey   5/23/2022  3:45 PM MD Sloan Haines MD  2/17/2022  2:31 PM

## 2022-02-21 NOTE — TELEPHONE ENCOUNTER
From: Ann Kendrick  Sent: 2/20/2022 10:33 AM EST  To: Alon Ramos Clinical Staff  Subject: Epimenio Course    We can do that! Im game. Thank you!

## 2022-02-22 DIAGNOSIS — R29.898 LEG HEAVINESS: ICD-10-CM

## 2022-02-22 LAB
C-REACTIVE PROTEIN: 15.6 MG/L (ref 0–5.1)
LACTATE DEHYDROGENASE: 186 U/L (ref 100–190)
SEDIMENTATION RATE, ERYTHROCYTE: 9 MM/HR (ref 0–20)
TOTAL CK: 151 U/L (ref 26–192)

## 2022-02-23 LAB
ANTI-NUCLEAR ANTIBODY (ANA): NEGATIVE
ANTI-SS-A IGG: <0.2 AI (ref 0–0.9)
ANTI-SS-B IGG: <0.2 AI (ref 0–0.9)

## 2022-02-24 LAB
ACETYLCHOLINE BINDING ANTIBODY: 0 NMOL/L (ref 0–0.4)
ACETYLCHOLINE BLOCKING AB: 20 % (ref 0–26)

## 2022-03-31 ENCOUNTER — HOSPITAL ENCOUNTER (OUTPATIENT)
Dept: CT IMAGING | Age: 22
Discharge: HOME | End: 2022-03-31
Payer: COMMERCIAL

## 2022-03-31 DIAGNOSIS — I10 HYPERTENSION, UNSPECIFIED TYPE: ICD-10-CM

## 2022-03-31 DIAGNOSIS — R29.898 LEG HEAVINESS: ICD-10-CM

## 2022-03-31 PROCEDURE — 74174 CTA ABD&PLVS W/CONTRAST: CPT

## 2022-03-31 PROCEDURE — 6360000004 HC RX CONTRAST MEDICATION: Performed by: FAMILY MEDICINE

## 2022-03-31 RX ADMIN — IOPAMIDOL 80 ML: 755 INJECTION, SOLUTION INTRAVENOUS at 07:40

## 2022-05-23 ENCOUNTER — OFFICE VISIT (OUTPATIENT)
Dept: PRIMARY CARE CLINIC | Age: 22
End: 2022-05-23
Payer: COMMERCIAL

## 2022-05-23 VITALS
BODY MASS INDEX: 23.31 KG/M2 | HEART RATE: 87 BPM | SYSTOLIC BLOOD PRESSURE: 149 MMHG | WEIGHT: 115.4 LBS | TEMPERATURE: 98.8 F | DIASTOLIC BLOOD PRESSURE: 88 MMHG

## 2022-05-23 DIAGNOSIS — I10 HYPERTENSION, UNSPECIFIED TYPE: Primary | ICD-10-CM

## 2022-05-23 DIAGNOSIS — R00.0 TACHYCARDIA: ICD-10-CM

## 2022-05-23 DIAGNOSIS — M43.6 NECK STIFFNESS: ICD-10-CM

## 2022-05-23 DIAGNOSIS — G44.221 CHRONIC TENSION-TYPE HEADACHE, INTRACTABLE: ICD-10-CM

## 2022-05-23 PROCEDURE — 99214 OFFICE O/P EST MOD 30 MIN: CPT | Performed by: FAMILY MEDICINE

## 2022-05-23 RX ORDER — METOPROLOL SUCCINATE 25 MG/1
12.5 TABLET, EXTENDED RELEASE ORAL DAILY
Qty: 45 TABLET | Refills: 1 | Status: SHIPPED | OUTPATIENT
Start: 2022-05-23 | End: 2022-08-18

## 2022-05-23 ASSESSMENT — ENCOUNTER SYMPTOMS
EYE PAIN: 0
CONSTIPATION: 0
SHORTNESS OF BREATH: 0
DIARRHEA: 0
COUGH: 0
ABDOMINAL PAIN: 0

## 2022-05-23 NOTE — PROGRESS NOTES
Chief Complaint   Patient presents with    Blood Pressure Check         ASSESSMENT/PLAN:  1. Hypertension, unspecified type  Reviewed imaging and labs with patient. Will continue lisinopril 10 mg daily and add metoprolol 12.5 mg daily   Check renin, aldosterone to rule out elevations  Check echo to rule out structural abnormality post COVID given faint/possible systolic murmur.   - Renin Activity and Aldosterone; Future  - Microalbumin / Creatinine Urine Ratio; Future  - ECHO Complete 2D W Doppler W Color    2. Chronic tension-type headache, intractable  Referral placed for OMT to work on neck stiffness. Patient holds tension in shoulders and neck and experiences a chronic pressure in the back of her head. Tylenol and ibuprofen do not help. It doesn't throb. - 29 Pancho Hunter DO, Osteopathic Manipulation Treatment, [facility]    3. Tachycardia  Start metoprolol to see if this helps rate and BP  Follow up echo  - metoprolol succinate (TOPROL XL) 25 MG extended release tablet; Take 0.5 tablets by mouth daily  Dispense: 45 tablet; Refill: 1  - ECHO Complete 2D W Doppler W Color    4. Neck stiffness  Referral to OMT placed for eval and treatment. - Agueda Rollins DO, Osteopathic Manipulation Treatment, [facility]         HPI:  Shawnee Giang is a 23 y.o. (: 2000) here today for HTN mgmt. Ever since she was ill with COVID, 1.5 yrs ago, she has had difficulty with BP mgmt and HR. HTN  Rx: llisinopril 10 mg daily  Ruled out vasc abnormality like fibromuscular dysplagia, normal blood work  Following [facility]  Feels like her HR is always high, feels heart beating fast when she is laying bed or resting  Lab Results   Component Value Date    CREATININE 0.6 2022   Has tried to check BP outside of office in grocery stores and gets the same numbers as office.      BP Readings from Last 3 Encounters:   22 (!) 149/88   22 (!) 145/86   22 (!) 157/101 Chronic tension type headache at back of skull. Experiencing neck stiffness and tension  No other neuro symptoms associated  Has tried stretching and massage. Tylenol and ibuprofen dont work  Sometimes an ice pack will make it feel less intense. Review of Systems   Constitutional: Negative for appetite change, chills, fatigue and fever. HENT: Negative for congestion. Eyes: Negative for pain and visual disturbance. Respiratory: Negative for cough and shortness of breath. Cardiovascular: Negative for chest pain and palpitations. Gastrointestinal: Negative for abdominal pain, constipation and diarrhea. Genitourinary: Negative for difficulty urinating. Musculoskeletal: Negative for arthralgias. Skin: Negative for rash and wound. Neurological: Positive for headaches. Negative for dizziness, weakness and light-headedness. Hematological: Does not bruise/bleed easily. Psychiatric/Behavioral: Negative for behavioral problems. Past Medical History:   Diagnosis Date    Hypertension        Family History   Problem Relation Age of Onset    Hyperthyroidism Mother     Diabetes Maternal Grandmother     Diabetes Maternal Grandfather     Diabetes Paternal Grandmother     Diabetes Paternal Grandfather        Social History     Tobacco Use    Smoking status: Never Smoker    Smokeless tobacco: Never Used   Substance Use Topics    Alcohol use:  Yes    Drug use: Not on file       New Prescriptions    METOPROLOL SUCCINATE (TOPROL XL) 25 MG EXTENDED RELEASE TABLET    Take 0.5 tablets by mouth daily       Meds Prior to visit:  Current Outpatient Medications on File Prior to Visit   Medication Sig Dispense Refill    lisinopril (PRINIVIL;ZESTRIL) 10 MG tablet Take 1 tablet by mouth daily 90 tablet 1    famotidine (PEPCID) 20 MG tablet Take 1 tablet by mouth 2 times daily 60 tablet 0    vitamin D3 (CHOLECALCIFEROL) 25 MCG (1000 UT) TABS tablet Take 1 tablet by mouth daily 90 tablet 1    desogestrel-ethinyl estradiol (KARIVA) 0.15-0.02/0.01 MG (21/5) per tablet Take 1 tablet by mouth daily       No current facility-administered medications on file prior to visit. No Known Allergies    OBJECTIVE:  BP (!) 149/88   Pulse 87   Temp 98.8 °F (37.1 °C) (Temporal)   Wt 115 lb 6.4 oz (52.3 kg)   LMP 05/17/2022   Breastfeeding No   BMI 23.31 kg/m²   BP Readings from Last 2 Encounters:   05/23/22 (!) 149/88   02/17/22 (!) 145/86     Wt Readings from Last 3 Encounters:   05/23/22 115 lb 6.4 oz (52.3 kg)   02/17/22 114 lb (51.7 kg)   02/06/22 114 lb 3.2 oz (51.8 kg)       Physical Exam  Vitals reviewed. Constitutional:       General: She is not in acute distress. Appearance: Normal appearance. She is normal weight. She is not ill-appearing. HENT:      Head: Normocephalic and atraumatic. Right Ear: External ear normal.      Left Ear: External ear normal.      Nose: Nose normal. No congestion. Mouth/Throat:      Mouth: Mucous membranes are moist.      Pharynx: Oropharynx is clear. No oropharyngeal exudate. Eyes:      Extraocular Movements: Extraocular movements intact. Conjunctiva/sclera: Conjunctivae normal.      Pupils: Pupils are equal, round, and reactive to light. Cardiovascular:      Rate and Rhythm: Normal rate and regular rhythm. Pulses: Normal pulses. Heart sounds: Normal heart sounds. Pulmonary:      Effort: Pulmonary effort is normal. No respiratory distress. Breath sounds: Normal breath sounds. No stridor. No wheezing, rhonchi or rales. Abdominal:      General: Bowel sounds are normal.      Palpations: Abdomen is soft. Tenderness: There is no abdominal tenderness. Musculoskeletal:         General: Normal range of motion. Cervical back: Normal range of motion and neck supple. Right lower leg: No edema. Left lower leg: No edema. Skin:     General: Skin is warm.       Capillary Refill: Capillary refill takes less than 2 seconds. Findings: No erythema or rash. Neurological:      General: No focal deficit present. Mental Status: She is alert and oriented to person, place, and time. Mental status is at baseline. Motor: No weakness. Coordination: Coordination normal.      Gait: Gait normal.      Deep Tendon Reflexes: Reflexes normal.   Psychiatric:         Mood and Affect: Mood normal.         Behavior: Behavior normal.         Thought Content: Thought content normal.         Judgment: Judgment normal.         Lab Results   Component Value Date    WBC 5.2 01/21/2022    HGB 13.2 01/21/2022    HCT 39.2 01/21/2022    MCV 88.7 01/21/2022     01/21/2022     Lab Results   Component Value Date     01/21/2022    K 4.4 01/21/2022     01/21/2022    CO2 23 01/21/2022    BUN 10 01/21/2022    CREATININE 0.6 01/21/2022    GLUCOSE 89 01/21/2022    CALCIUM 9.8 01/21/2022    PROT 7.1 01/21/2022    LABALBU 4.5 01/21/2022    BILITOT 0.3 01/21/2022    ALKPHOS 52 01/21/2022    AST 19 01/21/2022    ALT 7 (L) 01/21/2022    LABGLOM >60 01/21/2022    GFRAA >60 01/21/2022    AGRATIO 1.7 01/21/2022     No results found for: CHOL  No results found for: TRIG  No results found for: HDL  No results found for: LDLCHOLESTEROL, LDLCALC  No results found for: LABVLDL, VLDL  No results found for: LABA1C      Discussed use, benefit, and side effects of prescribed medications. Barriers to medication compliance addressed. All patient questions answered. Pt voiced understanding. RTC No follow-ups on file.     Future Appointments   Date Time Provider [facility]   6/8/2022  7:30 AM DO RONY Reagan  [place] - D[facility]D   8/8/2022  3:00 PM Romario Nieto MD [place] [facility]       Marie Sanchez MD  5/23/2022  5:48 PM

## 2022-05-26 DIAGNOSIS — I10 HYPERTENSION, UNSPECIFIED TYPE: ICD-10-CM

## 2022-05-27 LAB
CREATININE URINE: 40.3 MG/DL (ref 28–259)
MICROALBUMIN UR-MCNC: <1.2 MG/DL
MICROALBUMIN/CREAT UR-RTO: NORMAL MG/G (ref 0–30)

## 2022-05-29 LAB
ALDOSTERONE/RENIN ACTIVITY CALCULATION: 0.3 RATIO
ALDOSTERONE: 6.2 NG/DL
RENIN ACTIVITY: 19.9 NG/ML/HR

## 2022-06-08 ENCOUNTER — OFFICE VISIT (OUTPATIENT)
Dept: PRIMARY CARE CLINIC | Age: 22
End: 2022-06-08
Payer: COMMERCIAL

## 2022-06-08 VITALS
HEIGHT: 59 IN | HEART RATE: 83 BPM | TEMPERATURE: 97 F | WEIGHT: 115.6 LBS | BODY MASS INDEX: 23.31 KG/M2 | SYSTOLIC BLOOD PRESSURE: 137 MMHG | DIASTOLIC BLOOD PRESSURE: 85 MMHG

## 2022-06-08 DIAGNOSIS — M99.00 SOMATIC DYSFUNCTION OF HEAD REGION: ICD-10-CM

## 2022-06-08 DIAGNOSIS — G44.229 CHRONIC TENSION-TYPE HEADACHE, NOT INTRACTABLE: Primary | ICD-10-CM

## 2022-06-08 DIAGNOSIS — S16.1XXA STRAIN OF NECK MUSCLE, INITIAL ENCOUNTER: ICD-10-CM

## 2022-06-08 DIAGNOSIS — M99.01 SOMATIC DYSFUNCTION OF CERVICAL REGION: ICD-10-CM

## 2022-06-08 DIAGNOSIS — M99.08 SOMATIC DYSFUNCTION OF RIB REGION: ICD-10-CM

## 2022-06-08 PROCEDURE — 99213 OFFICE O/P EST LOW 20 MIN: CPT | Performed by: STUDENT IN AN ORGANIZED HEALTH CARE EDUCATION/TRAINING PROGRAM

## 2022-06-08 PROCEDURE — 98926 OSTEOPATH MANJ 3-4 REGIONS: CPT | Performed by: STUDENT IN AN ORGANIZED HEALTH CARE EDUCATION/TRAINING PROGRAM

## 2022-06-08 ASSESSMENT — ENCOUNTER SYMPTOMS
NAUSEA: 0
WHEEZING: 0
SHORTNESS OF BREATH: 0

## 2022-06-08 NOTE — PATIENT INSTRUCTIONS
Complete exercises once daily     Patient Education        Shoulder Blade: Exercises  Introduction  Here are some examples of exercises for you to try. The exercises may be suggested for a condition or for rehabilitation. Start each exercise slowly. Ease off the exercises if you start to have pain. You will be told when to start these exercises and which ones will work bestfor you. How to do the exercises  Shoulder roll    1. Stand tall with your chin slightly tucked. Imagine that a string at the top of your head is pulling you straight up. 2. Keep your arms relaxed. All motion will be in your shoulders. 3. Shrug your shoulders up toward your ears, then up and back. [place] your shoulders down and back, like you're sliding your hands down into your back pants pockets. 4. Repeat the circles at least 2 to 4 times. 5. This exercise is also helpful anytime you want to relax. Lower neck and upper back stretch    1. With your arms about shoulder height, clasp your hands in front of you. 2. Drop your chin toward your chest.  3. Reach straight forward so you are rounding your upper back. Think about pulling your shoulder blades apart. Judy Andrade feel a stretch across your upper back and shoulders. Hold for at least 6 seconds. 4. Repeat 2 to 4 times. Triceps stretch    1. Reach your arm straight up. 2. Keeping your elbow in place, bend your arm and reach your hand down behind your back. 3. With your other hand, apply gentle pressure to the bent elbow. Judy Andrade feel a stretch at the back of your upper arm and shoulder. Hold about 6 seconds. 4. Repeat 2 to 4 times with each arm. Shoulder stretch    1. Relax your shoulders. 2. Raise one arm to shoulder height, and reach it across your chest.  3. Pull the arm slightly toward you with your other arm. This will help you get a gentle stretch. Hold for about 6 seconds. 4. Repeat 2 to 4 times. Shoulder blade squeeze    1.  Sit or stand up tall with your arms at your sides.  2. Keep your shoulders relaxed and down, not shrugged. 3. Squeeze your shoulder blades together. Hold for 6 seconds, then relax. 4. Repeat 8 to 12 times. Straight-arm shoulder blade squeeze    1. Sit or stand tall. Relax your shoulders. 2. With palms down, hold your elastic tubing or band straight out in front of you. 3. Start with slight tension in the tubing or band, with your hands about shoulder-width apart. 4. Slowly pull straight out to the sides, squeezing your shoulder blades together. Keep your arms straight and at shoulder height. Slowly release. 5. Repeat 8 to 12 times. Rowing    1. [place] your elastic tubing or band at about waist height. Take one end in each hand. 2. Sit or stand with your feet hip-width apart. 3. Hold your arms straight in front of you. Adjust your distance to create slight tension in the tubing or band. 4. Slightly tuck your chin. Relax your shoulders. 5. Without shrugging your shoulders, pull straight back. Your elbows will pass alongside your waist.  Pull-downs    1. [place] your elastic tubing or band in the top of a closed door. Take one end in each hand. 2. Either sit or stand, depending on what is more comfortable. If you feel unsteady, sit on a chair. 3. Start with your arms up and comfortably apart, elbows straight. There should be a slight tension in the tubing or band. 4. Slightly tuck your chin, and look straight ahead. 5. Keeping your back straight, slowly pull down and back, bending your elbows. 6. Stop where your hands are level with your chin, in a \"goalpost\" position. 7. Repeat 8 to 12 times. Chest T stretch    1. Lie on your back. Raise your knees so they are bent. Plant your feet on the floor, hip-width apart. 2. Tuck your chin, and relax your shoulders. 3. Reach your arms straight out to the sides.  If you don't feel a mild stretch in your shoulders and across your chest, use a foam roll or a tightly rolled blanket under your spine, from your tailbone to your head. 4. Relax in this position for at least 15 to 30 seconds while you breathe normally. Repeat 2 to 4 times. 5. As you get used to this stretch, keep adding a little more time until you are able relax in this position for 2 or 3 minutes. When you can relax for at least 2 minutes, you only need to do the exercise 1 time per session. Chest goalpost stretch    1. Lie on your back. Raise your knees so they are bent. Plant your feet on the floor, hip-width apart. 2. Tuck your chin, and relax your shoulders. 3. Reach your arms straight out to the sides. 4. Bend your arms at the elbows, with your hands pointed toward the top of your head. Your arms should make an L on either side of your head. Your palms should be facing up. 5. If you don't feel a mild stretch in your shoulders and across your chest, use a foam roll or tightly rolled blanket under your spine, from your tailbone to your head. 6. Relax in this position for at least 15 to 30 seconds while you breathe normally. Repeat 2 to 4 times. 7. Each day you do this exercise, add a little more time until you can relax in this position for 2 or 3 minutes. When you can relax for at least 2 minutes, you only need to do the exercise 1 time per session. Follow-up care is a key part of your treatment and safety. Be sure to make and go to all appointments, and call your doctor if you are having problems. It's also a good idea to know your test results and keep alist of the medicines you take. Where can you learn more? Go to [email or web address] and sign in to your Next[employer] account. Enter [phone] in the [employer] box to learn more about \"Shoulder Blade: Exercises. \"     If you do not have an account, please click on the \"Sign Up Now\" link. Current as of: July 1, 2021               Content Version: 13.2  © 8787-8289 Healthwise, Incorporated. Care instructions adapted under license by [facility] ([facility]).  If you have questions about a medical condition or this instruction, always ask your healthcare professional. [place] [address] any warranty or liability for your use of this information.

## 2022-06-08 NOTE — PROGRESS NOTES
filled with lean meats, vegetables. Stays well-hydrated. States that she is cognizant of her posture while at work -she is currently at a desk job at [employer] Mood well controlled, endorses restful sleep     Review of Systems   Constitutional: Negative for activity change and unexpected weight change. HENT: Negative for tinnitus. Eyes: Negative for visual disturbance. Respiratory: Negative for shortness of breath and wheezing. Cardiovascular: Negative for chest pain, palpitations and leg swelling. Gastrointestinal: Negative for nausea. Genitourinary: Negative for decreased urine volume. Neurological: Negative for syncope, light-headedness and headaches. Past Medical History:   Diagnosis Date    Hypertension        Past Surgical History:   Procedure Laterality Date    ARM SURGERY Left 2007  SEPTOPLASTY 2016       Current Outpatient Medications   Medication Sig Dispense Refill    metoprolol succinate (TOPROL XL) 25 MG extended release tablet Take 0.5 tablets by mouth daily 45 tablet 1    lisinopril (PRINIVIL;ZESTRIL) 10 MG tablet Take 1 tablet by mouth daily 90 tablet 1    vitamin D3 (CHOLECALCIFEROL) 25 MCG (1000 UT) TABS tablet Take 1 tablet by mouth daily 90 tablet 1    desogestrel-ethinyl estradiol (KARIVA) 0.15-0.02/0.01 MG (21/5) per tablet Take 1 tablet by mouth daily      famotidine (PEPCID) 20 MG tablet Take 1 tablet by mouth 2 times daily (Patient not taking: Reported on 6/8/2022) 60 tablet 0     No current facility-administered medications for this visit. /85 (Site: Right Upper Arm, Position: Sitting, Cuff Size: Medium Adult)   Pulse 83   Temp 97 °F (36.1 °C) (Temporal)   Ht 4' 11\" (1.499 m)   Wt 115 lb 9.6 oz (52.4 kg)   LMP 05/17/2022   BMI 23.35 kg/m²     PHYSICAL EXAM  Physical Exam  Vitals reviewed. Constitutional:       General: She is not in acute distress. Appearance: Normal appearance. HENT:      Head: Normocephalic and atraumatic. Eyes:      Extraocular Movements: Extraocular movements intact. Pupils: Pupils are equal, round, and reactive to light. Cardiovascular:      Rate and Rhythm: Normal rate and regular rhythm. Pulses: Normal pulses. Heart sounds: Normal heart sounds. No murmur heard. No gallop. Pulmonary:      Effort: Pulmonary effort is normal.      Breath sounds: Normal breath sounds. No wheezing or rales. Abdominal:      General: Abdomen is flat. Palpations: Abdomen is soft. Musculoskeletal:      Right lower leg: No edema. Left lower leg: No edema. Skin:     General: Skin is warm and dry. Neurological:      Mental Status: She is alert. Pat Seats, DO       This dictation was generated by voice recognition computer software. Although all attempts are made to edit the dictation for accuracy, there may be errors in the transcription that are not intended.

## 2022-06-20 ENCOUNTER — HOSPITAL ENCOUNTER (OUTPATIENT)
Dept: NON INVASIVE DIAGNOSTICS | Age: 22
Discharge: HOME | End: 2022-06-20
Payer: COMMERCIAL

## 2022-06-20 LAB
LV EF: 63 %
LVEF MODALITY: NORMAL

## 2022-06-20 PROCEDURE — 93306 TTE W/DOPPLER COMPLETE: CPT

## 2022-06-22 ENCOUNTER — OFFICE VISIT (OUTPATIENT)
Dept: PRIMARY CARE CLINIC | Age: 22
End: 2022-06-22
Payer: COMMERCIAL

## 2022-06-22 VITALS
HEIGHT: 59 IN | BODY MASS INDEX: 23.22 KG/M2 | WEIGHT: 115.2 LBS | TEMPERATURE: 96.8 F | SYSTOLIC BLOOD PRESSURE: 140 MMHG | DIASTOLIC BLOOD PRESSURE: 88 MMHG | HEART RATE: 98 BPM

## 2022-06-22 DIAGNOSIS — M99.00 SOMATIC DYSFUNCTION OF HEAD REGION: ICD-10-CM

## 2022-06-22 DIAGNOSIS — I10 HYPERTENSION, UNSPECIFIED TYPE: Primary | ICD-10-CM

## 2022-06-22 DIAGNOSIS — G44.209 TENSION HEADACHE: ICD-10-CM

## 2022-06-22 DIAGNOSIS — M99.01 SOMATIC DYSFUNCTION OF CERVICAL REGION: ICD-10-CM

## 2022-06-22 DIAGNOSIS — M99.07 SOMATIC DYSFUNCTION OF UPPER EXTREMITY: ICD-10-CM

## 2022-06-22 PROCEDURE — 99214 OFFICE O/P EST MOD 30 MIN: CPT | Performed by: STUDENT IN AN ORGANIZED HEALTH CARE EDUCATION/TRAINING PROGRAM

## 2022-06-22 PROCEDURE — 98926 OSTEOPATH MANJ 3-4 REGIONS: CPT | Performed by: STUDENT IN AN ORGANIZED HEALTH CARE EDUCATION/TRAINING PROGRAM

## 2022-06-22 SDOH — ECONOMIC STABILITY: FOOD INSECURITY: WITHIN THE PAST 12 MONTHS, THE FOOD YOU BOUGHT JUST DIDN'T LAST AND YOU DIDN'T HAVE MONEY TO GET MORE.: NEVER TRUE

## 2022-06-22 SDOH — ECONOMIC STABILITY: FOOD INSECURITY: WITHIN THE PAST 12 MONTHS, YOU WORRIED THAT YOUR FOOD WOULD RUN OUT BEFORE YOU GOT MONEY TO BUY MORE.: NEVER TRUE

## 2022-06-22 ASSESSMENT — ENCOUNTER SYMPTOMS
NAUSEA: 0
SHORTNESS OF BREATH: 0
WHEEZING: 0

## 2022-06-22 ASSESSMENT — SOCIAL DETERMINANTS OF HEALTH (SDOH): HOW HARD IS IT FOR YOU TO PAY FOR THE VERY BASICS LIKE FOOD, HOUSING, MEDICAL CARE, AND HEATING?: NOT HARD AT ALL

## 2022-06-22 NOTE — PROGRESS NOTES
[facility]  Osteopathic Manipulative Treatment  2022     Patient:  Elvis Jenkins                                               : 2000  Age: 23 y. o. Date : 2022    Elvis Jenkins is a 23 y.o. female who presents for :      Chief Complaint   Patient presents with    Other     omt       ASSESSMENT/PLAN    Osteopathic structural exam performed on cranial, cervical, thoracic, lumbar, pelvic and sacral regions. Somatic dysfunction diagnoses: C3 extended rotated side bent left, cranial compression pattern, levator scapular dysfunction. Osteopathic manipulative treatment (OMT) performed: Suboccipital release, trapezius blast, pins technique, HVLA cervical spine, cranial venous sinus drainage. Patient reports increased range of motion and decreased tenderness in areas treated. Patient advised of possible side effects including generalized myalgias for 24 hours after treatment. Diagnosis Orders   1. Hypertension, unspecified type   Controlled with lisinopril, metoprolol. We will plan to discontinue OCP, continue to monitor blood pressure at home and determine if this may be contributing. 2. Tension headache   uncontrolled, patient declines additional pharmacotherapy today -may benefit from nightly Elavil. We will plan to discontinue OCP, continue home physical therapy and maintain proper posture. 3. Somatic dysfunction of cervical region     4. Somatic dysfunction of head region     5. Somatic dysfunction of upper extremity          Return in about 2 months (around 2022) for headache . HPI  Patient presents today for follow-up on neck strain, tension headache. Since previous appointment, she has been completing her home neck strengthening exercises with improvement in pain. She notes persistent, daily headaches. She also notes that she was driving home in the dark a few nights ago, and had significant photophobia with her headache.   She denies unilateral headache, nausea, vomiting, no history of head trauma. Headaches last for minutes to hours. Not present upon awakening. She endorses a healthy lifestyle as previously noted. She has been taking Kariva birth control, no other medication changes. Currently not taking anything over-the-counter for headache, previously tried ibuprofen without improvement. Review of Systems   Constitutional: Negative for activity change and unexpected weight change. HENT: Negative for tinnitus. Eyes: Negative for visual disturbance. Respiratory: Negative for shortness of breath and wheezing. Cardiovascular: Negative for chest pain, palpitations and leg swelling. Gastrointestinal: Negative for nausea. Genitourinary: Negative for decreased urine volume. Neurological: Positive for headaches. Negative for syncope and light-headedness. Past Medical History:   Diagnosis Date    Hypertension        Past Surgical History:   Procedure Laterality Date    ARM SURGERY Left 2007  SEPTOPLASTY  2016       Current Outpatient Medications   Medication Sig Dispense Refill    metoprolol succinate (TOPROL XL) 25 MG extended release tablet Take 0.5 tablets by mouth daily 45 tablet 1    lisinopril (PRINIVIL;ZESTRIL) 10 MG tablet Take 1 tablet by mouth daily 90 tablet 1    vitamin D3 (CHOLECALCIFEROL) 25 MCG (1000 UT) TABS tablet Take 1 tablet by mouth daily 90 tablet 1    desogestrel-ethinyl estradiol (KARIVA) 0.15-0.02/0.01 MG (21/5) per tablet Take 1 tablet by mouth daily      famotidine (PEPCID) 20 MG tablet Take 1 tablet by mouth 2 times daily (Patient not taking: Reported on 6/8/2022) 60 tablet 0     No current facility-administered medications for this visit. BP (!) 140/88   Pulse 98   Temp 96.8 °F (36 °C) (Temporal)   Ht 4' 11\" (1.499 m)   Wt 115 lb 3.2 oz (52.3 kg)   BMI 23.27 kg/m²     PHYSICAL EXAM  Physical Exam  Vitals reviewed. Constitutional:       General: She is not in acute distress. Appearance: Normal appearance. HENT:      Head: Normocephalic and atraumatic. Eyes:      Extraocular Movements: Extraocular movements intact. Pupils: Pupils are equal, round, and reactive to light. Cardiovascular:      Rate and Rhythm: Normal rate and regular rhythm. Pulses: Normal pulses. Heart sounds: Normal heart sounds. No murmur heard. No gallop. Pulmonary:      Effort: Pulmonary effort is normal.      Breath sounds: Normal breath sounds. No wheezing or rales. Abdominal:      General: Abdomen is flat. Palpations: Abdomen is soft. Musculoskeletal:      Right lower leg: No edema. Left lower leg: No edema. Skin:     General: Skin is warm and dry. Neurological:      Mental Status: She is alert. Sweetie Christianson DO       This dictation was generated by voice recognition computer software. Although all attempts are made to edit the dictation for accuracy, there may be errors in the transcription that are not intended.

## 2022-06-28 ENCOUNTER — PATIENT MESSAGE (OUTPATIENT)
Dept: PRIMARY CARE CLINIC | Age: 22
End: 2022-06-28

## 2022-06-28 DIAGNOSIS — G43.809 OTHER MIGRAINE WITHOUT STATUS MIGRAINOSUS, NOT INTRACTABLE: Primary | ICD-10-CM

## 2022-06-28 NOTE — TELEPHONE ENCOUNTER
From: See Marin  To: Dr. Cely Taylor: 6/28/2022 2:44 PM EDT  Subject: Worsening headaches    Hi Dr Dc Hall! Since everything seems to be normal with the tests Ive done so far, I decided to stop taking my birth control to see if it helps both my headaches and blood pressure. Its been 6 days and my headaches are the worst that theyve been. Kirt Richard now also traveling to the front of my head and just overall causing a weird sensation. I have tried taking excedrin, but that doesnt help at all. I know that headaches can be a side effect of stopping birth control, but since I was already having headaches, I wanted to let you know that theyre getting worse. If you can recommend anything that might help, Im all ears. Thank you!   Denisse Rojas

## 2022-06-29 RX ORDER — BUTALBITAL, ACETAMINOPHEN AND CAFFEINE 50; 325; 40 MG/1; MG/1; MG/1
1 TABLET ORAL EVERY 4 HOURS PRN
Qty: 15 TABLET | Refills: 3 | Status: SHIPPED | OUTPATIENT
Start: 2022-06-29 | End: 2022-08-18

## 2022-07-21 ENCOUNTER — TELEPHONE (OUTPATIENT)
Dept: PRIMARY CARE CLINIC | Age: 22
End: 2022-07-21

## 2022-08-04 DIAGNOSIS — I10 BENIGN ESSENTIAL HTN: ICD-10-CM

## 2022-08-04 RX ORDER — LISINOPRIL 10 MG/1
TABLET ORAL
Qty: 90 TABLET | Refills: 1 | Status: SHIPPED | OUTPATIENT
Start: 2022-08-04 | End: 2022-08-18

## 2022-08-04 NOTE — TELEPHONE ENCOUNTER
Medication:   Requested Prescriptions     Pending Prescriptions Disp Refills    lisinopril (PRINIVIL;ZESTRIL) 10 MG tablet [Pharmacy Med Name: LISINOPRIL 10 MG TABLET] 90 tablet 1     Sig: TAKE ONE TABLET BY MOUTH DAILY        Last Filled:  2/8/22    Patient Phone Number: [phone] (home)     Last appt: 6/22/2022   Next appt: Visit date not found    Last OARRS: No flowsheet data found.

## 2022-08-18 ENCOUNTER — OFFICE VISIT (OUTPATIENT)
Dept: PRIMARY CARE CLINIC | Age: 22
End: 2022-08-18
Payer: COMMERCIAL

## 2022-08-18 VITALS
DIASTOLIC BLOOD PRESSURE: 98 MMHG | HEART RATE: 85 BPM | TEMPERATURE: 98.5 F | SYSTOLIC BLOOD PRESSURE: 150 MMHG | BODY MASS INDEX: 23.27 KG/M2 | WEIGHT: 115.2 LBS

## 2022-08-18 DIAGNOSIS — G43.809 OTHER MIGRAINE WITHOUT STATUS MIGRAINOSUS, NOT INTRACTABLE: ICD-10-CM

## 2022-08-18 DIAGNOSIS — J35.1 LARGE TONSILS: ICD-10-CM

## 2022-08-18 DIAGNOSIS — I10 BENIGN ESSENTIAL HTN: Primary | ICD-10-CM

## 2022-08-18 PROCEDURE — 99214 OFFICE O/P EST MOD 30 MIN: CPT | Performed by: FAMILY MEDICINE

## 2022-08-18 ASSESSMENT — ENCOUNTER SYMPTOMS
COUGH: 0
ABDOMINAL PAIN: 0
CONSTIPATION: 0
EYE PAIN: 0
DIARRHEA: 0
SORE THROAT: 1
SHORTNESS OF BREATH: 0

## 2022-08-18 NOTE — PROGRESS NOTES
Chief Complaint   Patient presents with    Headache         ASSESSMENT/PLAN:  1. Benign essential HTN  Elevated in triage but home numbers are controlled, low 100s/70s  Only sometimes 140s over 90s. 2. Other migraine without status migrainosus, not intractable  Referral placed for evaluation of migraine versus tension headache versus musculoskeletal imbalance  - [facility] - Damien Rodriguez MD, Neurology, [facility]    3. Large tonsils  Referral placed for evaluation of large tonsils  Will rule out elevated ASO titer, mono and elevated white count  Does have history of tonsillar stones but none seen at this time  - Alexa Wong, , Otolaryngology, [facility]; Future  - MONONUCLEOSIS SCREEN; Future  - CBC with Auto Differential; Future       HPI:  Willis Almeida is a 23 y.o. (: 2000) here today for headaches and HTN. Headaches/pressure in back of head  Thought to be elevated BP at first, now that is controlled at home off meds. Though it was possible musculoskeletal issues, OMT didn't help. Have tried migraines meds without success. Treated for with 10 day course of amox for sore throat that was negative for strep (swab and culture). Throat feels better, but tonsils are still very large. No fevers, chills or LAD. No nausea, abd pain or vomiting. Home numbers: 107/85  Stopped BC and this helped he BP the most.   She also stopped her BP meds. Review of Systems   Constitutional:  Negative for appetite change, chills, fatigue and fever. HENT:  Positive for sore throat. Negative for congestion. Eyes:  Negative for pain and visual disturbance. Respiratory:  Negative for cough and shortness of breath. Cardiovascular:  Negative for chest pain and palpitations. Gastrointestinal:  Negative for abdominal pain, constipation and diarrhea. Genitourinary:  Negative for difficulty urinating. Musculoskeletal:  Negative for arthralgias.    Skin: Negative for rash and wound. Neurological:  Positive for headaches. Negative for dizziness, weakness and light-headedness. Hematological:  Does not bruise/bleed easily. Psychiatric/Behavioral:  Negative for behavioral problems. Past Medical History:   Diagnosis Date    Hypertension        Family History   Problem Relation Age of Onset    Hyperthyroidism Mother     Diabetes Maternal Grandmother     Diabetes Maternal Grandfather     Diabetes Paternal Grandmother     Diabetes Paternal Grandfather        Social History     Tobacco Use    Smoking status: Never    Smokeless tobacco: Never   Substance Use Topics    Alcohol use: Yes       New Prescriptions    No medications on file       Meds Prior to visit:  Current Outpatient Medications on File Prior to Visit   Medication Sig Dispense Refill    vitamin D3 (CHOLECALCIFEROL) 25 MCG (1000 UT) TABS tablet Take 1 tablet by mouth daily 90 tablet 1     No current facility-administered medications on file prior to visit. No Known Allergies    OBJECTIVE:  BP (!) 150/98   Pulse 85   Temp 98.5 °F (36.9 °C) (Temporal)   Wt 115 lb 3.2 oz (52.3 kg)   BMI 23.27 kg/m²   BP Readings from Last 2 Encounters:   08/18/22 (!) 150/98   06/22/22 (!) 140/88     Wt Readings from Last 3 Encounters:   08/18/22 115 lb 3.2 oz (52.3 kg)   06/22/22 115 lb 3.2 oz (52.3 kg)   06/08/22 115 lb 9.6 oz (52.4 kg)       Physical Exam  Vitals reviewed. Constitutional:       General: She is not in acute distress. Appearance: Normal appearance. She is normal weight. She is not ill-appearing. HENT:      Head: Normocephalic and atraumatic. Right Ear: Tympanic membrane, ear canal and external ear normal. There is no impacted cerumen. Left Ear: Tympanic membrane, ear canal and external ear normal. There is no impacted cerumen. Nose: Nose normal. No congestion or rhinorrhea. Mouth/Throat:      Mouth: Mucous membranes are moist.      Pharynx: Oropharynx is clear.  No oropharyngeal exudate. Eyes:      Extraocular Movements: Extraocular movements intact. Conjunctiva/sclera: Conjunctivae normal.      Pupils: Pupils are equal, round, and reactive to light. Cardiovascular:      Rate and Rhythm: Normal rate and regular rhythm. Pulses: Normal pulses. Heart sounds: Normal heart sounds. No gallop. Pulmonary:      Effort: Pulmonary effort is normal. No respiratory distress. Breath sounds: Normal breath sounds. No stridor. No wheezing, rhonchi or rales. Abdominal:      General: Abdomen is flat. Bowel sounds are normal.      Palpations: Abdomen is soft. Tenderness: There is no abdominal tenderness. Musculoskeletal:         General: Normal range of motion. Cervical back: Normal range of motion and neck supple. Right lower leg: No edema. Left lower leg: No edema. Skin:     General: Skin is warm and dry. Capillary Refill: Capillary refill takes less than 2 seconds. Findings: No erythema or rash. Neurological:      General: No focal deficit present. Mental Status: She is alert and oriented to person, place, and time. Mental status is at baseline. Motor: No weakness. Coordination: Coordination normal.      Gait: Gait normal.   Psychiatric:         Mood and Affect: Mood normal.         Behavior: Behavior normal.         Thought Content:  Thought content normal.         Judgment: Judgment normal.       Lab Results   Component Value Date    WBC 5.2 01/21/2022    HGB 13.2 01/21/2022    HCT 39.2 01/21/2022    MCV 88.7 01/21/2022     01/21/2022     Lab Results   Component Value Date     01/21/2022    K 4.4 01/21/2022     01/21/2022    CO2 23 01/21/2022    BUN 10 01/21/2022    CREATININE 0.6 01/21/2022    GLUCOSE 89 01/21/2022    CALCIUM 9.8 01/21/2022    PROT 7.1 01/21/2022    LABALBU 4.5 01/21/2022    BILITOT 0.3 01/21/2022    ALKPHOS 52 01/21/2022    AST 19 01/21/2022    ALT 7 (L) 01/21/2022 LABGLOM >60 01/21/2022    GFRAA >60 01/21/2022    AGRATIO 1.7 01/21/2022     No results found for: CHOL  No results found for: TRIG  No results found for: HDL  No results found for: LDLCHOLESTEROL, LDLCALC  No results found for: LABVLDL, VLDL  No results found for: LABA1C      Discussed use, benefit, and side effects of prescribed medications. Barriers to medication compliance addressed. All patient questions answered. Pt voiced understanding. RTC Return in about 4 weeks (around 9/15/2022).     Future Appointments   Date Time Provider Seble Massey   10/24/2022  3:20 PM Naresh Dominguez MD [facility] GYN KRYSTINA Cooper MD  8/18/2022  5:44 PM

## 2022-08-19 DIAGNOSIS — J35.1 LARGE TONSILS: ICD-10-CM

## 2022-08-19 LAB
ANTISTREPTOLYSIN-O: 136 IU/ML (ref 0–200)
BASOPHILS ABSOLUTE: 0 K/UL (ref 0–0.2)
BASOPHILS RELATIVE PERCENT: 0.5 %
EOSINOPHILS ABSOLUTE: 0.1 K/UL (ref 0–0.6)
EOSINOPHILS RELATIVE PERCENT: 1.5 %
HCT VFR BLD CALC: 38.1 % (ref 36–48)
HEMOGLOBIN: 12.8 G/DL (ref 12–16)
LYMPHOCYTES ABSOLUTE: 2.5 K/UL (ref 1–5.1)
LYMPHOCYTES RELATIVE PERCENT: 35 %
MCH RBC QN AUTO: 29.6 PG (ref 26–34)
MCHC RBC AUTO-ENTMCNC: 33.5 G/DL (ref 31–36)
MCV RBC AUTO: 88.5 FL (ref 80–100)
MONO TEST: NEGATIVE
MONOCYTES ABSOLUTE: 0.7 K/UL (ref 0–1.3)
MONOCYTES RELATIVE PERCENT: 9.6 %
NEUTROPHILS ABSOLUTE: 3.8 K/UL (ref 1.7–7.7)
NEUTROPHILS RELATIVE PERCENT: 53.4 %
PDW BLD-RTO: 13.6 % (ref 12.4–15.4)
PLATELET # BLD: 427 K/UL (ref 135–450)
PMV BLD AUTO: 6.5 FL (ref 5–10.5)
RBC # BLD: 4.31 M/UL (ref 4–5.2)
WBC # BLD: 7.2 K/UL (ref 4–11)

## 2022-08-24 ENCOUNTER — OFFICE VISIT (OUTPATIENT)
Dept: ENT CLINIC | Age: 22
End: 2022-08-24
Payer: COMMERCIAL

## 2022-08-24 VITALS
DIASTOLIC BLOOD PRESSURE: 88 MMHG | HEART RATE: 121 BPM | WEIGHT: 115 LBS | HEIGHT: 59 IN | BODY MASS INDEX: 23.18 KG/M2 | SYSTOLIC BLOOD PRESSURE: 152 MMHG

## 2022-08-24 DIAGNOSIS — J35.1 TONSILLAR HYPERTROPHY: ICD-10-CM

## 2022-08-24 DIAGNOSIS — J35.01 CHRONIC TONSILLITIS: ICD-10-CM

## 2022-08-24 DIAGNOSIS — J03.91 ACUTE RECURRENT TONSILLITIS: Primary | ICD-10-CM

## 2022-08-24 PROCEDURE — 99204 OFFICE O/P NEW MOD 45 MIN: CPT | Performed by: OTOLARYNGOLOGY

## 2022-08-24 ASSESSMENT — ENCOUNTER SYMPTOMS
CHOKING: 0
RHINORRHEA: 0
TROUBLE SWALLOWING: 0
COLOR CHANGE: 0
SINUS PAIN: 0
WHEEZING: 0
EYE DISCHARGE: 0
FACIAL SWELLING: 0
NAUSEA: 0
SORE THROAT: 0
BLOOD IN STOOL: 0
DIARRHEA: 0
COUGH: 0
PHOTOPHOBIA: 0
BACK PAIN: 0
SHORTNESS OF BREATH: 0
CONSTIPATION: 0
STRIDOR: 0
SINUS PRESSURE: 0
EYE ITCHING: 0
VOICE CHANGE: 0
VOMITING: 0

## 2022-08-24 NOTE — PROGRESS NOTES
[facility]  [address] [address] [facility], [address] [place] [address] Av  P: [phone]  F: [phone]       Patient     Rosanna Verduzco  2000    ChiefComplaint     Chief Complaint   Patient presents with    New Patient     Patient is here today because she has enlarged tonsils and the get infected a lot       History of Present Illness     Rosanna Verduzco is a pleasant 23 y.o. female who presents as a new patient for chronic tonsillitis, recurrent tonsillitis, tonsillar hypertrophy. For years, she experiences 3-4 episodes of pustular tonsillitis that is typically rapid strep negative but is treated with antibiotics. She experiences significant sore throat, odynophagia and dysphagia. She also has chronic tonsillitis with tonsil stones causing bad breath. Her tonsils are significantly enlarged and she has difficulty swallowing pills. Denies any sleep disordered breathing symptoms. She is interested in tonsillectomy. No history of bleeding disorders.     Past Medical History     Past Medical History:   Diagnosis Date    Hypertension        Past Surgical History     Past Surgical History:   Procedure Laterality Date    ARM SURGERY Left 2007    SEPTOPLASTY  2016       Family History     Family History   Problem Relation Age of Onset    Hyperthyroidism Mother     Diabetes Maternal Grandmother     Diabetes Maternal Grandfather     Diabetes Paternal Grandmother     Diabetes Paternal Grandfather        Social History     Social History     Socioeconomic History    Marital status: Single     Spouse name: Not on file    Number of children: Not on file    Years of education: Not on file    Highest education level: Not on file   Occupational History    Not on file   Tobacco Use    Smoking status: Never    Smokeless tobacco: Never   Substance and Sexual Activity    Alcohol use: Yes    Drug use: Not on file    Sexual activity: Yes     Partners: Male   Other Topics Concern    Not on file   Social History Narrative    Not on file     Social Determinants of Health     Financial Resource Strain: Low Risk     Difficulty of Paying Living Expenses: Not hard at all   Food Insecurity: No Food Insecurity    Worried About Running Out of Food in the Last Year: Never true    Ran Out of Food in the Last Year: Never true   Transportation Needs: Not on file   Physical Activity: Not on file   Stress: Not on file   Social Connections: Not on file   Intimate Partner Violence: Not on file   Housing Stability: Not on file       Allergies     No Known Allergies    Medications     Current Outpatient Medications   Medication Sig Dispense Refill    vitamin D3 (CHOLECALCIFEROL) 25 MCG (1000 UT) TABS tablet Take 1 tablet by mouth daily 90 tablet 1     No current facility-administered medications for this visit. Review of Systems     Review of Systems   Constitutional:  Negative for activity change, appetite change, chills, diaphoresis, fatigue, fever and unexpected weight change. HENT:  Negative for congestion, dental problem, drooling, ear discharge, ear pain, facial swelling, hearing loss, mouth sores, nosebleeds, postnasal drip, rhinorrhea, sinus pressure, sinus pain, sneezing, sore throat, tinnitus, trouble swallowing and voice change. Eyes:  Negative for photophobia, discharge, itching and visual disturbance. Respiratory:  Negative for cough, choking, shortness of breath, wheezing and stridor. Gastrointestinal:  Negative for blood in stool, constipation, diarrhea, nausea and vomiting. Endocrine: Negative for cold intolerance, heat intolerance, polyphagia and polyuria. Musculoskeletal:  Negative for back pain, gait problem, neck pain and neck stiffness. Skin:  Negative for color change, pallor, rash and wound. Neurological:  Negative for dizziness, syncope, facial asymmetry, speech difficulty, light-headedness, numbness and headaches. Hematological:  Negative for adenopathy. Does not bruise/bleed easily. Right eye: No chemosis or exudate. Left eye: No chemosis or exudate. Neck:      Thyroid: No thyroid mass or thyromegaly. Vascular: Normal carotid pulses. Trachea: No tracheal tenderness or tracheal deviation. Cardiovascular:      Rate and Rhythm: Normal rate and regular rhythm. Pulmonary:      Effort: No tachypnea, bradypnea or respiratory distress. Breath sounds: No stridor. Musculoskeletal:      Right shoulder: Normal range of motion. Cervical back: Neck supple. Lymphadenopathy:      Head:      Right side of head: No submental, submandibular, tonsillar, preauricular, posterior auricular or occipital adenopathy. Left side of head: No submental, submandibular, tonsillar, preauricular, posterior auricular or occipital adenopathy. Cervical: No cervical adenopathy. Right cervical: No superficial, deep or posterior cervical adenopathy. Left cervical: No superficial, deep or posterior cervical adenopathy. Skin:     General: Skin is warm and dry. Findings: No bruising, erythema, laceration, lesion or rash. Neurological:      Mental Status: She is alert and oriented to person, place, and time. Cranial Nerves: No cranial nerve deficit. Psychiatric:         Speech: Speech normal.         Behavior: Behavior normal.         Procedure           Assessment and Plan     1. Acute recurrent tonsillitis  Patient experiences 3-4 episodes of tonsillitis a year with exudate requiring antibiotics. She experiences chronic tonsillitis with tonsil stones and halitosis. Her tonsils are significantly enlarged. She meets criteria for tonsillectomy. Risk, benefits and alternatives of the procedure discussed. Risk include, but not limited to bleeding, pain, dehydration, infection, risk of general anesthesia and death. She understands would like to proceed. She will need preoperative clearance from her primary physician. Pain management postoperatively discussed.   Call

## 2022-08-24 NOTE — LETTER
[facility]    Surgery Schedule Request Form: 08/24/22  [address] Rene Diaz [place]                            3 wk post op TBS      DATE OF SURGERY: 09-    TIME OF SURGERY:  1:45 PM            CONF #: ____________________       Patient Information:    Patient name: Rosanna Verduzco    YOB: 2000 Age/Sex:22 y.o./female    SS #:[id number]    Wt Readings from Last 1 Encounters:   08/24/22 115 lb (52.2 kg)       Telephone Information:   Mobile [phone]     Home [phone]     Surgeon & Procedure Information:     Lead surgeon: Chela Brito Co-Surgeon: no  Phone: [phone] Fax: [phone]  PCP: Anabel Goodman MD    Diagnosis: 1. Chronic tonsillitis J35.01  2. Recurrent acute tonsillitis J03.91 3. Tonsillar hypertrophy  J35.1    Procedure name/CPT: Tonsillectomy 12+ (04436)    Procedure length: 30 minutes Anesthesia: General    Special Equipment: no    Patient Status: SDS (OP)    Primary Payor Plan: R  Member ID: [id number]   [address] name: Wendy Drain Vaccinated? Yes    [] Implement attached clinical orders for patient.       Electronically signed by Shahid Harrell DO on 8/24/2022 at 4:18 PM

## 2022-08-27 ENCOUNTER — HOSPITAL ENCOUNTER (EMERGENCY)
Age: 22
Discharge: HOME | End: 2022-08-27
Attending: EMERGENCY MEDICINE
Payer: COMMERCIAL

## 2022-08-27 ENCOUNTER — APPOINTMENT (OUTPATIENT)
Dept: GENERAL RADIOLOGY | Age: 22
End: 2022-08-27
Payer: COMMERCIAL

## 2022-08-27 VITALS
WEIGHT: 116 LBS | TEMPERATURE: 98.9 F | HEART RATE: 102 BPM | RESPIRATION RATE: 14 BRPM | BODY MASS INDEX: 23.39 KG/M2 | DIASTOLIC BLOOD PRESSURE: 85 MMHG | SYSTOLIC BLOOD PRESSURE: 148 MMHG | OXYGEN SATURATION: 100 % | HEIGHT: 59 IN

## 2022-08-27 DIAGNOSIS — R74.01 TRANSAMINITIS: ICD-10-CM

## 2022-08-27 DIAGNOSIS — R00.0 TACHYCARDIA: ICD-10-CM

## 2022-08-27 DIAGNOSIS — R00.2 PALPITATIONS: Primary | ICD-10-CM

## 2022-08-27 LAB
A/G RATIO: 1.5 (ref 1.1–2.2)
ALBUMIN SERPL-MCNC: 4.8 G/DL (ref 3.4–5)
ALP BLD-CCNC: 74 U/L (ref 40–129)
ALT SERPL-CCNC: 45 U/L (ref 10–40)
ANION GAP SERPL CALCULATED.3IONS-SCNC: 12 MMOL/L (ref 3–16)
AST SERPL-CCNC: 45 U/L (ref 15–37)
BASOPHILS ABSOLUTE: 0 K/UL (ref 0–0.2)
BASOPHILS RELATIVE PERCENT: 0.2 %
BILIRUB SERPL-MCNC: <0.2 MG/DL (ref 0–1)
BILIRUBIN URINE: NEGATIVE
BLOOD, URINE: NEGATIVE
BUN BLDV-MCNC: 11 MG/DL (ref 7–20)
CALCIUM SERPL-MCNC: 9.5 MG/DL (ref 8.3–10.6)
CHLORIDE BLD-SCNC: 103 MMOL/L (ref 99–110)
CLARITY: CLEAR
CO2: 26 MMOL/L (ref 21–32)
COLOR: YELLOW
CREAT SERPL-MCNC: 0.6 MG/DL (ref 0.6–1.1)
D DIMER: <0.27 UG/ML FEU (ref 0–0.6)
EOSINOPHILS ABSOLUTE: 0 K/UL (ref 0–0.6)
EOSINOPHILS RELATIVE PERCENT: 0.2 %
GFR AFRICAN AMERICAN: >60
GFR NON-AFRICAN AMERICAN: >60
GLUCOSE BLD-MCNC: 103 MG/DL (ref 70–99)
GLUCOSE URINE: NEGATIVE MG/DL
HCG(URINE) PREGNANCY TEST: NEGATIVE
HCT VFR BLD CALC: 37.6 % (ref 36–48)
HEMOGLOBIN: 12.6 G/DL (ref 12–16)
KETONES, URINE: NEGATIVE MG/DL
LEUKOCYTE ESTERASE, URINE: NEGATIVE
LYMPHOCYTES ABSOLUTE: 1.5 K/UL (ref 1–5.1)
LYMPHOCYTES RELATIVE PERCENT: 12.5 %
MAGNESIUM: 1.8 MG/DL (ref 1.8–2.4)
MCH RBC QN AUTO: 29.1 PG (ref 26–34)
MCHC RBC AUTO-ENTMCNC: 33.6 G/DL (ref 31–36)
MCV RBC AUTO: 86.7 FL (ref 80–100)
MICROSCOPIC EXAMINATION: NORMAL
MONOCYTES ABSOLUTE: 0.7 K/UL (ref 0–1.3)
MONOCYTES RELATIVE PERCENT: 5.7 %
NEUTROPHILS ABSOLUTE: 9.6 K/UL (ref 1.7–7.7)
NEUTROPHILS RELATIVE PERCENT: 81.4 %
NITRITE, URINE: NEGATIVE
PDW BLD-RTO: 13.6 % (ref 12.4–15.4)
PH UA: 6 (ref 5–8)
PLATELET # BLD: 460 K/UL (ref 135–450)
PMV BLD AUTO: 6.8 FL (ref 5–10.5)
POTASSIUM REFLEX MAGNESIUM: 3.5 MMOL/L (ref 3.5–5.1)
PROTEIN UA: NEGATIVE MG/DL
RBC # BLD: 4.34 M/UL (ref 4–5.2)
SODIUM BLD-SCNC: 141 MMOL/L (ref 136–145)
SPECIFIC GRAVITY UA: <=1.005 (ref 1–1.03)
TOTAL PROTEIN: 7.9 G/DL (ref 6.4–8.2)
TSH SERPL DL<=0.05 MIU/L-ACNC: 2.07 UIU/ML (ref 0.27–4.2)
URINE REFLEX TO CULTURE: NORMAL
URINE TYPE: NORMAL
UROBILINOGEN, URINE: 0.2 E.U./DL
WBC # BLD: 11.8 K/UL (ref 4–11)

## 2022-08-27 PROCEDURE — 84703 CHORIONIC GONADOTROPIN ASSAY: CPT

## 2022-08-27 PROCEDURE — 84443 ASSAY THYROID STIM HORMONE: CPT

## 2022-08-27 PROCEDURE — 85379 FIBRIN DEGRADATION QUANT: CPT

## 2022-08-27 PROCEDURE — 80053 COMPREHEN METABOLIC PANEL: CPT

## 2022-08-27 PROCEDURE — 81003 URINALYSIS AUTO W/O SCOPE: CPT

## 2022-08-27 PROCEDURE — 99285 EMERGENCY DEPT VISIT HI MDM: CPT

## 2022-08-27 PROCEDURE — 83735 ASSAY OF MAGNESIUM: CPT

## 2022-08-27 PROCEDURE — 96360 HYDRATION IV INFUSION INIT: CPT

## 2022-08-27 PROCEDURE — 93005 ELECTROCARDIOGRAM TRACING: CPT | Performed by: EMERGENCY MEDICINE

## 2022-08-27 PROCEDURE — 71045 X-RAY EXAM CHEST 1 VIEW: CPT

## 2022-08-27 PROCEDURE — 85025 COMPLETE CBC W/AUTO DIFF WBC: CPT

## 2022-08-27 PROCEDURE — 2580000003 HC RX 258: Performed by: EMERGENCY MEDICINE

## 2022-08-27 RX ORDER — 0.9 % SODIUM CHLORIDE 0.9 %
1000 INTRAVENOUS SOLUTION INTRAVENOUS ONCE
Status: COMPLETED | OUTPATIENT
Start: 2022-08-27 | End: 2022-08-27

## 2022-08-27 RX ADMIN — SODIUM CHLORIDE 1000 ML: 9 INJECTION, SOLUTION INTRAVENOUS at 16:24

## 2022-08-27 ASSESSMENT — PAIN SCALES - GENERAL: PAINLEVEL_OUTOF10: 5

## 2022-08-27 ASSESSMENT — ENCOUNTER SYMPTOMS
WHEEZING: 0
BACK PAIN: 0
COUGH: 0
DIARRHEA: 0
PHOTOPHOBIA: 0
NAUSEA: 0
SHORTNESS OF BREATH: 0
RHINORRHEA: 0
VOMITING: 0
ABDOMINAL DISTENTION: 0

## 2022-08-27 ASSESSMENT — PAIN - FUNCTIONAL ASSESSMENT: PAIN_FUNCTIONAL_ASSESSMENT: 0-10

## 2022-08-27 ASSESSMENT — PAIN DESCRIPTION - LOCATION: LOCATION: HEAD

## 2022-08-27 ASSESSMENT — PAIN DESCRIPTION - DESCRIPTORS: DESCRIPTORS: ACHING

## 2022-08-27 NOTE — DISCHARGE INSTRUCTIONS
You are seen for palpitations. Your heart rate was elevated which improved after IV fluids. Unclear cause of your symptoms. Your liver tests are borderline elevated. You have a pending thyroid test which you need to follow-up with your primary doctor for. We recommend that you follow-up with a rheumatologist as well call and arrange follow-up appointment. Return to emergency department if you have loss of consciousness chest pain difficulty breathing or other emergent concerns.

## 2022-08-27 NOTE — ED PROVIDER NOTES
leg: No edema. Skin:     General: Skin is warm. Findings: No rash. Neurological:      Mental Status: She is alert and oriented to person, place, and time. Cranial Nerves: No cranial nerve deficit. Motor: No abnormal muscle tone. Coordination: Coordination normal.      Gait: Gait normal.      Comments: No tremor   Psychiatric:         Mood and Affect: Mood normal.         Behavior: Behavior normal.         ED Course    ED Medication Orders (From admission, onward)      Start Ordered     Status Ordering Provider    08/27/22 1615 08/27/22 1601  0.9 % sodium chloride bolus  ONCE         Last MAR action: Stopped - by Stephan Tarango on 08/27/22 at [facility]            EKG  Rhythm is sinus tachycardia  Rate is 120  Axis is normal  Conduction Abnormalities none noted  No STEMI criteria  Qtc is 433      Radiology  XR CHEST PORTABLE    Result Date: 8/27/2022  EXAM: XR CHEST PORTABLE INDICATION: dizzy COMPARISON: None FINDINGS: Medical Devices: None. Lungs: Clear. Heart and Mediastinum: Normal. Other: N/A.      Normal.       Labs  Results for orders placed or performed during the hospital encounter of 08/27/22   CBC with Auto Differential   Result Value Ref Range    WBC 11.8 (H) 4.0 - 11.0 K/uL    RBC 4.34 4.00 - 5.20 M/uL    Hemoglobin 12.6 12.0 - 16.0 g/dL    Hematocrit 37.6 36.0 - 48.0 %    MCV 86.7 80.0 - 100.0 fL    MCH 29.1 26.0 - 34.0 pg    MCHC 33.6 31.0 - 36.0 g/dL    RDW 13.6 12.4 - 15.4 %    Platelets 940 (H) 018 - 450 K/uL    MPV 6.8 5.0 - 10.5 fL    Neutrophils % 81.4 %    Lymphocytes % 12.5 %    Monocytes % 5.7 %    Eosinophils % 0.2 %    Basophils % 0.2 %    Neutrophils Absolute 9.6 (H) 1.7 - 7.7 K/uL    Lymphocytes Absolute 1.5 1.0 - 5.1 K/uL    Monocytes Absolute 0.7 0.0 - 1.3 K/uL    Eosinophils Absolute 0.0 0.0 - 0.6 K/uL    Basophils Absolute 0.0 0.0 - 0.2 K/uL   CMP w/ Reflex to MG   Result Value Ref Range    Sodium 141 136 - 145 mmol/L    Potassium reflex Magnesium 3.5 3.5 - 5.1 mmol/L    Chloride 103 99 - 110 mmol/L    CO2 26 21 - 32 mmol/L    Anion Gap 12 3 - 16    Glucose 103 (H) 70 - 99 mg/dL    BUN 11 7 - 20 mg/dL    Creatinine 0.6 0.6 - 1.1 mg/dL    GFR Non-African American >60 >60    GFR African American >60 >60    Calcium 9.5 8.3 - 10.6 mg/dL    Total Protein 7.9 6.4 - 8.2 g/dL    Albumin 4.8 3.4 - 5.0 g/dL    Albumin/Globulin Ratio 1.5 1.1 - 2.2    Total Bilirubin <0.2 0.0 - 1.0 mg/dL    Alkaline Phosphatase 74 40 - 129 U/L    ALT 45 (H) 10 - 40 U/L    AST 45 (H) 15 - 37 U/L   Urine Preg (Lab)   Result Value Ref Range    HCG(Urine) Pregnancy Test Negative Detects HCG level >20 MIU/mL   Urinalysis with Reflex to Culture    Specimen: Urine voided   Result Value Ref Range    Color, UA Yellow Straw/Yellow    Clarity, UA Clear Clear    Glucose, Ur Negative Negative mg/dL    Bilirubin Urine Negative Negative    Ketones, Urine Negative Negative mg/dL    Specific Gravity, UA <=1.005 1.005 - 1.030    Blood, Urine Negative Negative    pH, UA 6.0 5.0 - 8.0    Protein, UA Negative Negative mg/dL    Urobilinogen, Urine 0.2 <2.0 E.U./dL    Nitrite, Urine Negative Negative    Leukocyte Esterase, Urine Negative Negative    Microscopic Examination Not Indicated     Urine Type NotGiven     Urine Reflex to Culture Not Indicated    D-Dimer, Quantitative   Result Value Ref Range    D-Dimer, Quant <0.27 0.00 - 0.60 ug/mL FEU   Magnesium   Result Value Ref Range    Magnesium 1.80 1.80 - 2.40 mg/dL         Procedures  Procedures      MDM  Patient seen and evaluated. Relevant records reviewed. - Patient is 23 y.o. female presented for dizziness and palpitations. On exam she is well-appearing no acute distress she is tachycardic initially in the 120s which improved to the low 100s after a liter of IV fluids. She otherwise has an unremarkable physical exam.  Work-up here is overall reassuring. Her EKG is without acute process identified or strain pattern. No evidence of arrhythmia.   I doubt pericarditis or myocarditis given her TTE and reassuring lab work. Borderline transaminitis. We did discuss the possibility of a vasculitis and I am referring her to rheumatology. Patient reports she feels symptomatically improved after IV fluids and her heart rate has improved significantly. Still in the low 100s however patient states she wishes to leave she wants to go to a [employer] game. - I have a low concern for PE, thyroid storm, pheo, sepsis, or other emergent process, and do not see indication for further work-up in the ER, as it is unlikely  and poses more risk than benefit. - I discussed the results, including any incidental findings, with patient. Questions answered. We agreed to d/c with follow-up and return precautions. Patient/family agreeable to plan and express understanding of plan. Clinical Impression:  1. Palpitations    2. Tachycardia    3. Transaminitis          Disposition:  Discharge to home in good condition. Blood pressure (!) 148/85, pulse (!) 106, temperature 98.9 °F (37.2 °C), temperature source Oral, resp. rate 14, height 4' 11\" (1.499 m), weight 116 lb (52.6 kg), last menstrual period 07/27/2022, SpO2 100 %, not currently breastfeeding. Patient was given scripts for the following medications. I counseled patient how to take these medications. New Prescriptions    No medications on file       Disposition referral (if applicable):  Maria A Bernstein MD  [address] [place] [address] [facility]  [phone]    Schedule an appointment as soon as possible for a visit       [address] [id number] [phone]  Schedule an appointment as soon as possible for a visit       I, Sima Burton, am the primary attending of record and contributed the majority of evaluation and treatment of emergent care for this encounter.      Total critical care time is 0 minutes, which excludes separately billable procedures and updating family. Time spent is specifically for management of the presenting complaint and symptoms initially, direct bedside care, reevaluation, review of records, and consultation. There was a high probability of clinically significant life-threatening deterioration in the patient's condition, which required my urgent intervention. This chart was generated in part by using Dragon Dictation system and may contain errors related to that system including errors in grammar, punctuation, and spelling, as well as words and phrases that may be inappropriate. If there are any questions or concerns please feel free to contact the dictating provider for clarification.      MD Rojas Alcala MD  08/27/22 [id number]

## 2022-08-29 ENCOUNTER — OFFICE VISIT (OUTPATIENT)
Dept: PRIMARY CARE CLINIC | Age: 22
End: 2022-08-29
Payer: COMMERCIAL

## 2022-08-29 VITALS
WEIGHT: 115.8 LBS | HEART RATE: 97 BPM | BODY MASS INDEX: 23.39 KG/M2 | SYSTOLIC BLOOD PRESSURE: 133 MMHG | DIASTOLIC BLOOD PRESSURE: 87 MMHG

## 2022-08-29 DIAGNOSIS — Z01.818 PREOP EXAMINATION: ICD-10-CM

## 2022-08-29 DIAGNOSIS — Z01.818 PREOP EXAMINATION: Primary | ICD-10-CM

## 2022-08-29 DIAGNOSIS — R74.01 TRANSAMINITIS: ICD-10-CM

## 2022-08-29 DIAGNOSIS — J35.01 CHRONIC TONSILLITIS: ICD-10-CM

## 2022-08-29 DIAGNOSIS — J35.1 TONSILLAR HYPERTROPHY: ICD-10-CM

## 2022-08-29 LAB
A/G RATIO: 1.7 (ref 1.1–2.2)
ALBUMIN SERPL-MCNC: 4.6 G/DL (ref 3.4–5)
ALP BLD-CCNC: 67 U/L (ref 40–129)
ALT SERPL-CCNC: 42 U/L (ref 10–40)
ANION GAP SERPL CALCULATED.3IONS-SCNC: 11 MMOL/L (ref 3–16)
APTT: 30.3 SEC (ref 23–34.3)
AST SERPL-CCNC: 41 U/L (ref 15–37)
BASOPHILS ABSOLUTE: 0 K/UL (ref 0–0.2)
BASOPHILS RELATIVE PERCENT: 0.4 %
BILIRUB SERPL-MCNC: <0.2 MG/DL (ref 0–1)
BUN BLDV-MCNC: 9 MG/DL (ref 7–20)
CALCIUM SERPL-MCNC: 10 MG/DL (ref 8.3–10.6)
CHLORIDE BLD-SCNC: 101 MMOL/L (ref 99–110)
CO2: 27 MMOL/L (ref 21–32)
CREAT SERPL-MCNC: 0.6 MG/DL (ref 0.6–1.1)
EOSINOPHILS ABSOLUTE: 0.1 K/UL (ref 0–0.6)
EOSINOPHILS RELATIVE PERCENT: 1.4 %
GFR AFRICAN AMERICAN: >60
GFR NON-AFRICAN AMERICAN: >60
GLUCOSE BLD-MCNC: 92 MG/DL (ref 70–99)
HCT VFR BLD CALC: 38.4 % (ref 36–48)
HEMOGLOBIN: 12.5 G/DL (ref 12–16)
INR BLD: 0.96 (ref 0.87–1.14)
LYMPHOCYTES ABSOLUTE: 2.3 K/UL (ref 1–5.1)
LYMPHOCYTES RELATIVE PERCENT: 31.6 %
MCH RBC QN AUTO: 29 PG (ref 26–34)
MCHC RBC AUTO-ENTMCNC: 32.7 G/DL (ref 31–36)
MCV RBC AUTO: 88.9 FL (ref 80–100)
MONOCYTES ABSOLUTE: 0.8 K/UL (ref 0–1.3)
MONOCYTES RELATIVE PERCENT: 11 %
NEUTROPHILS ABSOLUTE: 4 K/UL (ref 1.7–7.7)
NEUTROPHILS RELATIVE PERCENT: 55.6 %
PDW BLD-RTO: 13.9 % (ref 12.4–15.4)
PLATELET # BLD: 423 K/UL (ref 135–450)
PMV BLD AUTO: 6.8 FL (ref 5–10.5)
POTASSIUM SERPL-SCNC: 4.3 MMOL/L (ref 3.5–5.1)
PROTHROMBIN TIME: 12.7 SEC (ref 11.7–14.5)
RBC # BLD: 4.32 M/UL (ref 4–5.2)
SODIUM BLD-SCNC: 139 MMOL/L (ref 136–145)
TOTAL PROTEIN: 7.3 G/DL (ref 6.4–8.2)
WBC # BLD: 7.2 K/UL (ref 4–11)

## 2022-08-29 PROCEDURE — 99214 OFFICE O/P EST MOD 30 MIN: CPT | Performed by: FAMILY MEDICINE

## 2022-08-29 NOTE — PROGRESS NOTES
Subjective:     Chief Complaint   Patient presents with    Pre-op Exam     Tonsillectomy with Dr Raymundo Haque on 9/12/22 at the [facility] [address] is a 23 y.o. female who presents for a preoperative physical examination. She is scheduled to have a TONSILLECTOMY done by Dr. Bhaskar Paige at [facility] on 9/12/2022. History of Present Illness:    Emergent procedure NO  Active Cardiac Condition NO (decompensated HF, Arrhythmia, MI <3 weeks, severe valve disease)  Risk Level of Procedure Intermediate Risk (intraperitoneal, intrathoracic, HENT, orthopedic, or carotid endarterectomy, etc.)  Revised Cardiac Risk Index Risk factors: None  Measurement of Exercise Tolerance before Surgery >4 Yes    Further recommendations from consultants: None    Medication Recommendations: None     BP Readings from Last 3 Encounters:   08/29/22 133/87   08/27/22 (!) 148/85   08/24/22 (!) 152/88       Past Medical History:   Diagnosis Date    Hypertension         Review of patient's past surgical history indicates:     Past Surgical History:   Procedure Laterality Date    ARM SURGERY Left 2007    SEPTOPLASTY  2016                                                   Current Outpatient Medications   Medication Sig Dispense Refill    vitamin D3 (CHOLECALCIFEROL) 25 MCG (1000 UT) TABS tablet Take 1 tablet by mouth daily 90 tablet 1     No current facility-administered medications for this visit.        No Known Allergies    Social History     Tobacco Use    Smoking status: Never    Smokeless tobacco: Never   Substance Use Topics    Alcohol use: Yes        Family History   Problem Relation Age of Onset    Hyperthyroidism Mother     Diabetes Maternal Grandmother     Diabetes Maternal Grandfather     Diabetes Paternal Grandmother     Diabetes Paternal Grandfather         Review Of Systems  Skin: no abnormal pigmentation, rash, scaling, itching, masses, hair or nail changes  Eyes: negative  Ears/Nose/Throat: negative  Respiratory: negative  Cardiovascular: palpitations  Gastrointestinal: negative  Genitourinary: negative  Musculoskeletal: negative  Neurologic: negative  Psychiatric: negative  Hematologic/Lymphatic/Immunologic: negative  Endocrine: negative       Objective:      /87   Pulse 97   Wt 115 lb 12.8 oz (52.5 kg)   LMP 07/17/2022   BMI 23.39 kg/m²   General appearance - healthy, alert, no distress  Skin - Skin color, texture, turgor normal. No rashes or lesions. Head - Normocephalic. No masses, lesions, tenderness or abnormalities  Eyes - conjunctivae/corneas clear. PERRL, EOM's intact. Ears - External ears normal. Canals clear. TM's normal.  Nose/Sinuses - Nares normal. Septum midline. Mucosa normal. No drainage or sinus tenderness. Oropharynx - Lips, mucosa, and tongue normal. Teeth and gums normal. Oropharynx pink and patent  Neck - Neck supple. No adenopathy. Thyroid symmetric, normal size  Back - Back symmetric, no curvature. ROM normal. No CVA tenderness. Lungs - Good diaphragmatic excursion. Lungs clear to ausc BL  Heart - Regular rate and rhythm, with no rub, sys murmur present but unchanged. no gallop noted. Abdomen - Abdomen soft, non-tender. BS normal. No masses, no organomegaly  Extremities - Extremities normal. No deformities, edema, or skin discoloration  Musculoskeletal - spont ROM normal in all extremities. Muscular strength intact. Peripheral pulses - radial=4/4  Neuro - Gait normal. Patellar reflexes normal and symmetric. Sensation grossly normal.  No focal weakness      EKG: reviewed from 8/27/2022 and NSR, no acute ST changes. Assessment and Plan:   Frantz Bradshaw is a low risk for major cardiac complications during a intermediate risk procedure and may continue as planned per operating surgeon. See also orders filed with this encounter, if any. Specific medication recommendations are above.    Medications recommended to continue should be taken with a sip of water even when NPO. Diagnosis Orders   1. Preop examination  Protime-INR    APTT    CBC with Auto Differential      2. Tonsillar hypertrophy        3. Chronic tonsillitis        4. Transaminitis  Comprehensive Metabolic Panel          She is at low risk for cardiac event during surgery and anesthesia.     Future Appointments   Date Time Provider Seble Massey   10/24/2022  3:20 PM Roland Elder MD [facility]        Franky Simpson MD  8/29/2022  4:25 PM

## 2022-08-30 LAB
EKG ATRIAL RATE: 119 BPM
EKG DIAGNOSIS: NORMAL
EKG P AXIS: 63 DEGREES
EKG P-R INTERVAL: 176 MS
EKG Q-T INTERVAL: 308 MS
EKG QRS DURATION: 70 MS
EKG QTC CALCULATION (BAZETT): 433 MS
EKG R AXIS: 18 DEGREES
EKG T AXIS: 25 DEGREES
EKG VENTRICULAR RATE: 119 BPM

## 2022-08-30 PROCEDURE — 93010 ELECTROCARDIOGRAM REPORT: CPT | Performed by: INTERNAL MEDICINE

## 2022-09-02 NOTE — PROGRESS NOTES
Place       patient label inside box (if no patient label, complete below)  Name:  :  MR#:   Omega Kos / PROCEDURE  I (we), Nnekaher Briscoerosy  (Patient Name) authorize DR. Sugey Hardy (Provider / Josiane Hamilton) and/or such assistants as may be selected by him/her, to perform the following operation/procedure(s): TONSILLECTOMY       Note: If unable to obtain consent prior to an emergent procedure, document the emergent reason in the medical record. This procedure has been explained to my (our) satisfaction and included in the explanation was: The intended benefit, nature, and extent of the procedure to be performed; The significant risks involved and the probability of success; Alternative procedures and methods of treatment; The dangers and probable consequences of such alternatives (including no procedure or treatment); The expected consequences of the procedure on my future health; Whether other qualified individuals would be performing important surgical tasks and/or whether  would be present to advise or support the procedure. I (we) understand that there are other risks of infection and other serious complications in the pre-operative/procedural and postoperative/procedural stages of my (our) care. I (we) have asked all of the questions which I (we) thought were important in deciding whether or not to undergo treatment or diagnosis. These questions have been answered to my (our) satisfaction. I (we) understand that no assurance can be given that the procedure will be a success, and no guarantee or warranty of success has been given to me (us). It has been explained to me (us) that during the course of the operation/procedure, unforeseen conditions may be revealed that necessitate extension of the original procedure(s) or different procedure(s) than those set forth in Paragraph 1.  I (we) authorize and request that the above-named physician, his/her assistants or his/her designees, perform procedures as necessary and desirable if deemed to be in my (our) best interest.     Revised 8/2/2021                                                                          Page 1 of 2       I acknowledge that health care personnel may be observing this procedure for the purpose of medical education or other specified purposes as may be necessary as requested and/or approved by my (our) physician. I (we) consent to the disposal by the hospital Pathologist of the removed tissue, parts or organs in accordance with hospital policy. I do ____ do not ____ consent to the use of a local infiltration pain blocking agent that will be used by my provider/surgical provider to help alleviate pain during my procedure. I do ____ do not ____ consent to an emergent blood transfusion in the case of a life-threatening situation that requires blood components to be administered. This consent is valid for 24 hours from the beginning of the procedure. This patient does ____ or does not ____ currently have a DNR status/order. If DNR order is in place, obtain Addendum to the Surgical Consent for ALL Patients with a DNR Order to address og-operative status for limited intervention or DNR suspension.      I have read and fully understand the above Consent for Operation/Procedure and that all blanks were completed before I signed the consent.   _____________________________       _____________________      ____/____am/pm  Signature of Patient or legal representative      Printed Name / Relationship            Date / Time   ____________________________       _____________________      ____/____am/pm  Witness to Signature                                    Printed Name                    Date / Time    If patient is unable to sign or is a minor, complete the following)  Patient is a minor, ____ years of age, or unable to sign because: ______________________________________________________________________________________________    If a phone consent is obtained, consent will be documented by using two health care professionals, each affirming that the consenting party has no questions and gives consent for the procedure discussed with the physician/provider.   _____________________          ____________________       _____/_____am/pm   2nd witness to phone consent        Printed name           Date / Time    Informed Consent:  I have provided the explanation described above in section 1 to the patient and/or legal representative.  I have provided the patient and/or legal representative with an opportunity to ask any questions about the proposed operation/procedure.   ___________________________          ____________________         ____/____am/pm  Provider / Proceduralist                            Printed name            Date / Time  Revised 8/2/2021                                                                      Page 2 of 2

## 2022-09-02 NOTE — PROGRESS NOTES
[facility] PRE-SURGICAL TESTING INSTRUCTIONS                      PRIOR TO PROCEDURE DATE:    1. PLEASE FOLLOW ANY INSTRUCTIONS GIVEN TO YOU PER YOUR SURGEON. 2. Arrange for someone to drive you home and be with you for the first 24 hours after discharge for your safety after your procedure for which you received sedation. Ensure it is someone we can share information with regarding your discharge. NOTE: At this time ONLY 2 ADULTS may accompany you & everyone must be MASKED. 3. You must contact your surgeon for instructions IF:  You are taking any blood thinners, aspirin, anti-inflammatory or vitamins. There is a change in your physical condition such as a cold, fever, rash, cuts, sores, or any other infection, especially near your surgical site. 4. Do not drink alcohol the day before or day of your procedure. Do not use any recreational marijuana at least 24 hours or street drugs (heroin, cocaine) at minimum 5 days prior to your procedure. 5. A Pre-Surgical History and Physical MUST be completed WITHIN 30 DAYS OR LESS prior to your procedure. by your Physician or an Urgent Care        THE DAY OF YOUR PROCEDURE:  1. Follow instructions for ARRIVAL TIME as DIRECTED BY YOUR SURGEON. 2. Enter the MAIN entrance from [facility] and follow the signs to the free Parking Garage or [employer] (offered free of charge 7 am-5pm). 3. Enter the Main Entrance of the hospital (do not enter from the lower level of the parking garage). Upon entrance, check in with the  at the surgical information desk on your LEFT. Bring your insurance card and photo ID to register      4. DO NOT EAT ANYTHING 8 hours prior to arrival for surgery. You may have up to 8 ounces of water 4 hours prior to your arrival for surgery.    NOTE: ALL Gastric, Bariatric & Bowel surgery patients - you MUST follow your surgeon's instructions regarding eating/ drinking as you will have very specific instructions to follow. If you did not receive these, call your surgeon's office immediately. 5. MEDICATIONS:  Take the following medications with a SMALL sip of water:   Use your usual dose of inhalers the morning of surgery. BRING your rescue inhaler with you to hospital.   Anesthesia does NOT want you to take insulin the morning of surgery. They will control your blood sugar while you are at the hospital. Please contact your ordering physician for instructions regarding your insulin the night before your procedure. If you have an insulin pump, please keep it set on basal rate. Bariatric patient's call your surgeon if on diabetic medications as some may need to be stopped 1 week prior to surgery    6. Do not swallow additional water when brushing teeth. No gum, candy, mints, or ice chips. Refrain from smoking or at least decrease the amount on day of surgery. 7. Morning of surgery:   Take a shower with an antibacterial soap (i.e., Safeguard or Dial) OR your physician may have instructed you to use Hibiclens. Dress in loose, comfortable clothing appropriate for redressing after your procedure. Do not wear jewelry (including body piercings), make-up (especially NO eye make-up), fingernail polish (NO toenail polish if foot/leg surgery), lotion, powders, or metal hairclips. Do not shave or wax for 72 hours prior to procedure near your operative site. Shaving with a razor can irritate your skin and make it easier to develop an infection. On the day of your procedure, any hair that needs to be removed near the surgical site will be 'clipped' by a healthcare worker using a special clipper designed to avoid skin irritation. 8. Dentures, glasses, or contacts will need to be removed before your procedure. Bring cases for your glasses, contacts, dentures, or hearing aids to protect them while you are in surgery. 9. If you use a CPAP, please bring it with you on the day of your procedure.     10. We recommend that valuable personal belongings such as cash, cell phones, e-tablets, or jewelry, be left at home during your stay. The hospital will not be responsible for valuables that are not secured in the hospital safe. However, if your insurance requires a co-pay, you may want to bring a method of payment, i.e., Check or credit card, if you wish to pay your co-pay the day of surgery. 11. If you are to stay overnight, you may bring a bag with personal items. Please have any large items you may need brought in by your family after your arrival to your hospital room. 12. If you have a Living Will or Durable Power of , please bring a copy on the day of your procedure. How we keep you safe and work to prevent surgical site infections:   1. Health care workers should always check your ID bracelet to verify your name and birth date. You will be asked many times to state your name, date of birth, and allergies. 2. Health care workers should always clean their hands with soap or alcohol gel before providing care to you. It is okay to ask anyone if they cleaned their hands before they touch you. 3. You will be actively involved in verifying the type of procedure you are having and ensuring the correct surgical site. This will be confirmed multiple times prior to your procedure. Do NOT marcin your surgery site UNLESS instructed to by your surgeon. 4. When you are in the operating room, your surgical site will be cleansed with a special soap, and in most cases, you will be given an antibiotic before the surgery begins. What to expect AFTER your procedure? 1. Immediately following your procedure, your will be taken to the PACU for the first phase of your recovery. Your nurse will help you recover from any potential side effects of anesthesia, such as extreme drowsiness, changes in your vital signs or breathing patterns.  Nausea, headache, muscle aches, or sore throat may also occur after anesthesia. Your nurse will help you manage these potential side effects. 2. For comfort and safety, arrange to have someone at home with you for the first 24 hours after discharge. 3. You and your family will be given written instructions about your diet, activity, dressing care, medications, and return visits. 4. Once at home, should issues with nausea, pain, or bleeding occur, or should you notice any signs of infection, you should call your surgeon. 5. Always clean your hands before and after caring for your wound. Do not let your family touch your surgery site without cleaning their hands. 6. Narcotic pain medications can cause significant constipation. You may want to add a stool softener to your postoperative medication schedule or speak to your surgeon on how best to manage this SIDE EFFECT. SPECIAL INSTRUCTIONS     Thank you for allowing us to care for you. We strive to exceed your expectations in the delivery of care and service provided to you and your family. If you need to contact the [place] [address] staff for any reason, please call us at [phone]    Instructions reviewed with patient during preadmission testing phone interview.   Idalia Buenrostro RN.9/2/2022 .2:37 PM      ADDITIONAL EDUCATIONAL INFORMATION REVIEWED PER PHONE WITH YOU AND/OR YOUR FAMILY:  No Hibiclens® Bathing Instructions   Yes Antibacterial Soap

## 2022-09-09 ENCOUNTER — ANESTHESIA EVENT (OUTPATIENT)
Dept: OPERATING ROOM | Age: 22
End: 2022-09-09
Payer: COMMERCIAL

## 2022-09-12 ENCOUNTER — ANESTHESIA (OUTPATIENT)
Dept: OPERATING ROOM | Age: 22
End: 2022-09-12
Payer: COMMERCIAL

## 2022-09-12 ENCOUNTER — HOSPITAL ENCOUNTER (OUTPATIENT)
Age: 22
Setting detail: OUTPATIENT SURGERY
Discharge: HOME | End: 2022-09-12
Attending: OTOLARYNGOLOGY | Admitting: OTOLARYNGOLOGY
Payer: COMMERCIAL

## 2022-09-12 VITALS
OXYGEN SATURATION: 98 % | HEIGHT: 59 IN | DIASTOLIC BLOOD PRESSURE: 86 MMHG | BODY MASS INDEX: 23.06 KG/M2 | WEIGHT: 114.4 LBS | RESPIRATION RATE: 12 BRPM | SYSTOLIC BLOOD PRESSURE: 129 MMHG | TEMPERATURE: 97 F | HEART RATE: 97 BPM

## 2022-09-12 DIAGNOSIS — J35.01 CHRONIC TONSILLITIS: ICD-10-CM

## 2022-09-12 DIAGNOSIS — J35.1 HYPERTROPHY OF TONSILS ALONE: ICD-10-CM

## 2022-09-12 DIAGNOSIS — J03.91 RECURRENT ACUTE TONSILLITIS: ICD-10-CM

## 2022-09-12 DIAGNOSIS — G89.18 ACUTE POST-OPERATIVE PAIN: Primary | ICD-10-CM

## 2022-09-12 LAB
PREGNANCY, URINE: NEGATIVE
PREGNANCY, URINE: NEGATIVE

## 2022-09-12 PROCEDURE — 6360000002 HC RX W HCPCS: Performed by: ANESTHESIOLOGY

## 2022-09-12 PROCEDURE — 2500000003 HC RX 250 WO HCPCS: Performed by: OTOLARYNGOLOGY

## 2022-09-12 PROCEDURE — 2709999900 HC NON-CHARGEABLE SUPPLY: Performed by: OTOLARYNGOLOGY

## 2022-09-12 PROCEDURE — 88304 TISSUE EXAM BY PATHOLOGIST: CPT

## 2022-09-12 PROCEDURE — 2580000003 HC RX 258: Performed by: FAMILY MEDICINE

## 2022-09-12 PROCEDURE — 7100000000 HC PACU RECOVERY - FIRST 15 MIN: Performed by: OTOLARYNGOLOGY

## 2022-09-12 PROCEDURE — 6360000002 HC RX W HCPCS: Performed by: NURSE ANESTHETIST, CERTIFIED REGISTERED

## 2022-09-12 PROCEDURE — 6370000000 HC RX 637 (ALT 250 FOR IP): Performed by: ANESTHESIOLOGY

## 2022-09-12 PROCEDURE — A4217 STERILE WATER/SALINE, 500 ML: HCPCS | Performed by: OTOLARYNGOLOGY

## 2022-09-12 PROCEDURE — 3600000004 HC SURGERY LEVEL 4 BASE: Performed by: OTOLARYNGOLOGY

## 2022-09-12 PROCEDURE — 42826 REMOVAL OF TONSILS: CPT | Performed by: OTOLARYNGOLOGY

## 2022-09-12 PROCEDURE — 3700000001 HC ADD 15 MINUTES (ANESTHESIA): Performed by: OTOLARYNGOLOGY

## 2022-09-12 PROCEDURE — 3600000014 HC SURGERY LEVEL 4 ADDTL 15MIN: Performed by: OTOLARYNGOLOGY

## 2022-09-12 PROCEDURE — 3700000000 HC ANESTHESIA ATTENDED CARE: Performed by: OTOLARYNGOLOGY

## 2022-09-12 PROCEDURE — 2500000003 HC RX 250 WO HCPCS: Performed by: NURSE ANESTHETIST, CERTIFIED REGISTERED

## 2022-09-12 PROCEDURE — 7100000010 HC PHASE II RECOVERY - FIRST 15 MIN: Performed by: OTOLARYNGOLOGY

## 2022-09-12 PROCEDURE — 2580000003 HC RX 258: Performed by: OTOLARYNGOLOGY

## 2022-09-12 PROCEDURE — 7100000011 HC PHASE II RECOVERY - ADDTL 15 MIN: Performed by: OTOLARYNGOLOGY

## 2022-09-12 PROCEDURE — 84703 CHORIONIC GONADOTROPIN ASSAY: CPT

## 2022-09-12 PROCEDURE — 7100000001 HC PACU RECOVERY - ADDTL 15 MIN: Performed by: OTOLARYNGOLOGY

## 2022-09-12 RX ORDER — SODIUM CHLORIDE, SODIUM LACTATE, POTASSIUM CHLORIDE, CALCIUM CHLORIDE 600; 310; 30; 20 MG/100ML; MG/100ML; MG/100ML; MG/100ML
INJECTION, SOLUTION INTRAVENOUS CONTINUOUS
Status: DISCONTINUED | OUTPATIENT
Start: 2022-09-12 | End: 2022-09-12 | Stop reason: HOSPADM

## 2022-09-12 RX ORDER — ONDANSETRON 2 MG/ML
4 INJECTION INTRAMUSCULAR; INTRAVENOUS
Status: DISCONTINUED | OUTPATIENT
Start: 2022-09-12 | End: 2022-09-12 | Stop reason: HOSPADM

## 2022-09-12 RX ORDER — ACETAMINOPHEN 500 MG
500 TABLET ORAL EVERY 6 HOURS PRN
Qty: 60 TABLET | Refills: 0 | Status: SHIPPED | OUTPATIENT
Start: 2022-09-12

## 2022-09-12 RX ORDER — OXYCODONE HYDROCHLORIDE 5 MG/1
5 TABLET ORAL EVERY 6 HOURS PRN
Qty: 30 TABLET | Refills: 0 | Status: SHIPPED | OUTPATIENT
Start: 2022-09-12 | End: 2022-09-19

## 2022-09-12 RX ORDER — MEPERIDINE HYDROCHLORIDE 25 MG/ML
12.5 INJECTION INTRAMUSCULAR; INTRAVENOUS; SUBCUTANEOUS EVERY 5 MIN PRN
Status: DISCONTINUED | OUTPATIENT
Start: 2022-09-12 | End: 2022-09-12 | Stop reason: HOSPADM

## 2022-09-12 RX ORDER — LABETALOL HYDROCHLORIDE 5 MG/ML
10 INJECTION, SOLUTION INTRAVENOUS
Status: DISCONTINUED | OUTPATIENT
Start: 2022-09-12 | End: 2022-09-12 | Stop reason: HOSPADM

## 2022-09-12 RX ORDER — NALOXONE HYDROCHLORIDE 0.4 MG/ML
INJECTION, SOLUTION INTRAMUSCULAR; INTRAVENOUS; SUBCUTANEOUS PRN
Status: DISCONTINUED | OUTPATIENT
Start: 2022-09-12 | End: 2022-09-12 | Stop reason: SDUPTHER

## 2022-09-12 RX ORDER — SODIUM CHLORIDE 9 MG/ML
INJECTION, SOLUTION INTRAVENOUS PRN
Status: DISCONTINUED | OUTPATIENT
Start: 2022-09-12 | End: 2022-09-12 | Stop reason: HOSPADM

## 2022-09-12 RX ORDER — HYDRALAZINE HYDROCHLORIDE 20 MG/ML
10 INJECTION INTRAMUSCULAR; INTRAVENOUS
Status: DISCONTINUED | OUTPATIENT
Start: 2022-09-12 | End: 2022-09-12 | Stop reason: HOSPADM

## 2022-09-12 RX ORDER — LIDOCAINE HYDROCHLORIDE AND EPINEPHRINE 10; 10 MG/ML; UG/ML
INJECTION, SOLUTION INFILTRATION; PERINEURAL PRN
Status: DISCONTINUED | OUTPATIENT
Start: 2022-09-12 | End: 2022-09-12 | Stop reason: HOSPADM

## 2022-09-12 RX ORDER — SODIUM CHLORIDE 0.9 % (FLUSH) 0.9 %
5-40 SYRINGE (ML) INJECTION EVERY 12 HOURS SCHEDULED
Status: DISCONTINUED | OUTPATIENT
Start: 2022-09-12 | End: 2022-09-12 | Stop reason: HOSPADM

## 2022-09-12 RX ORDER — PROCHLORPERAZINE EDISYLATE 5 MG/ML
5 INJECTION INTRAMUSCULAR; INTRAVENOUS
Status: DISCONTINUED | OUTPATIENT
Start: 2022-09-12 | End: 2022-09-12 | Stop reason: HOSPADM

## 2022-09-12 RX ORDER — IBUPROFEN 400 MG/1
400 TABLET ORAL
Status: COMPLETED | OUTPATIENT
Start: 2022-09-12 | End: 2022-09-12

## 2022-09-12 RX ORDER — MAGNESIUM HYDROXIDE 1200 MG/15ML
LIQUID ORAL CONTINUOUS PRN
Status: DISCONTINUED | OUTPATIENT
Start: 2022-09-12 | End: 2022-09-12 | Stop reason: HOSPADM

## 2022-09-12 RX ORDER — SODIUM CHLORIDE 0.9 % (FLUSH) 0.9 %
5-40 SYRINGE (ML) INJECTION PRN
Status: DISCONTINUED | OUTPATIENT
Start: 2022-09-12 | End: 2022-09-12 | Stop reason: HOSPADM

## 2022-09-12 RX ORDER — LIDOCAINE HYDROCHLORIDE 20 MG/ML
INJECTION, SOLUTION EPIDURAL; INFILTRATION; INTRACAUDAL; PERINEURAL PRN
Status: DISCONTINUED | OUTPATIENT
Start: 2022-09-12 | End: 2022-09-12 | Stop reason: SDUPTHER

## 2022-09-12 RX ORDER — ONDANSETRON 2 MG/ML
INJECTION INTRAMUSCULAR; INTRAVENOUS PRN
Status: DISCONTINUED | OUTPATIENT
Start: 2022-09-12 | End: 2022-09-12 | Stop reason: SDUPTHER

## 2022-09-12 RX ORDER — IBUPROFEN 600 MG/1
600 TABLET ORAL EVERY 6 HOURS PRN
Qty: 60 TABLET | Refills: 0 | Status: SHIPPED | OUTPATIENT
Start: 2022-09-12 | End: 2022-09-23

## 2022-09-12 RX ORDER — PROPOFOL 10 MG/ML
INJECTION, EMULSION INTRAVENOUS PRN
Status: DISCONTINUED | OUTPATIENT
Start: 2022-09-12 | End: 2022-09-12 | Stop reason: SDUPTHER

## 2022-09-12 RX ORDER — MIDAZOLAM HYDROCHLORIDE 1 MG/ML
INJECTION INTRAMUSCULAR; INTRAVENOUS PRN
Status: DISCONTINUED | OUTPATIENT
Start: 2022-09-12 | End: 2022-09-12 | Stop reason: SDUPTHER

## 2022-09-12 RX ORDER — FENTANYL CITRATE 50 UG/ML
INJECTION, SOLUTION INTRAMUSCULAR; INTRAVENOUS PRN
Status: DISCONTINUED | OUTPATIENT
Start: 2022-09-12 | End: 2022-09-12 | Stop reason: SDUPTHER

## 2022-09-12 RX ORDER — ROCURONIUM BROMIDE 10 MG/ML
INJECTION, SOLUTION INTRAVENOUS PRN
Status: DISCONTINUED | OUTPATIENT
Start: 2022-09-12 | End: 2022-09-12 | Stop reason: SDUPTHER

## 2022-09-12 RX ORDER — DEXAMETHASONE SODIUM PHOSPHATE 4 MG/ML
INJECTION, SOLUTION INTRA-ARTICULAR; INTRALESIONAL; INTRAMUSCULAR; INTRAVENOUS; SOFT TISSUE PRN
Status: DISCONTINUED | OUTPATIENT
Start: 2022-09-12 | End: 2022-09-12 | Stop reason: SDUPTHER

## 2022-09-12 RX ORDER — OXYCODONE HYDROCHLORIDE 5 MG/1
5 TABLET ORAL
Status: DISCONTINUED | OUTPATIENT
Start: 2022-09-12 | End: 2022-09-12 | Stop reason: HOSPADM

## 2022-09-12 RX ADMIN — ROCURONIUM BROMIDE 30 MG: 10 INJECTION INTRAVENOUS at 14:22

## 2022-09-12 RX ADMIN — NALOXONE HYDROCHLORIDE 0.08 MG: 0.4 INJECTION, SOLUTION INTRAMUSCULAR; INTRAVENOUS; SUBCUTANEOUS at 15:05

## 2022-09-12 RX ADMIN — FENTANYL CITRATE 50 MCG: 50 INJECTION, SOLUTION INTRAMUSCULAR; INTRAVENOUS at 14:34

## 2022-09-12 RX ADMIN — SODIUM CHLORIDE, POTASSIUM CHLORIDE, SODIUM LACTATE AND CALCIUM CHLORIDE: 600; 310; 30; 20 INJECTION, SOLUTION INTRAVENOUS at 13:10

## 2022-09-12 RX ADMIN — SUGAMMADEX 200 MG: 100 INJECTION, SOLUTION INTRAVENOUS at 14:54

## 2022-09-12 RX ADMIN — HYDROMORPHONE HYDROCHLORIDE 0.25 MG: 1 INJECTION, SOLUTION INTRAMUSCULAR; INTRAVENOUS; SUBCUTANEOUS at 16:41

## 2022-09-12 RX ADMIN — FENTANYL CITRATE 50 MCG: 50 INJECTION, SOLUTION INTRAMUSCULAR; INTRAVENOUS at 14:22

## 2022-09-12 RX ADMIN — DEXAMETHASONE SODIUM PHOSPHATE 8 MG: 4 INJECTION, SOLUTION INTRAMUSCULAR; INTRAVENOUS at 14:26

## 2022-09-12 RX ADMIN — ONDANSETRON 4 MG: 2 INJECTION INTRAMUSCULAR; INTRAVENOUS at 14:26

## 2022-09-12 RX ADMIN — LIDOCAINE HYDROCHLORIDE 3 ML: 20 INJECTION, SOLUTION EPIDURAL; INFILTRATION; INTRACAUDAL; PERINEURAL at 14:22

## 2022-09-12 RX ADMIN — MIDAZOLAM HYDROCHLORIDE 2 MG: 2 INJECTION, SOLUTION INTRAMUSCULAR; INTRAVENOUS at 14:18

## 2022-09-12 RX ADMIN — IBUPROFEN 400 MG: 400 TABLET ORAL at 16:13

## 2022-09-12 RX ADMIN — PROPOFOL 200 MG: 10 INJECTION, EMULSION INTRAVENOUS at 14:22

## 2022-09-12 ASSESSMENT — PAIN SCALES - GENERAL
PAINLEVEL_OUTOF10: 6
PAINLEVEL_OUTOF10: 0
PAINLEVEL_OUTOF10: 0
PAINLEVEL_OUTOF10: 5
PAINLEVEL_OUTOF10: 0

## 2022-09-12 ASSESSMENT — PAIN DESCRIPTION - LOCATION: LOCATION: HEAD

## 2022-09-12 ASSESSMENT — PAIN DESCRIPTION - DESCRIPTORS: DESCRIPTORS: ACHING

## 2022-09-12 NOTE — BRIEF OP NOTE
Brief Postoperative Note      Patient: Adrian Small  YOB: 2000  MRN: [id number]    Date of Procedure: 9/12/2022    Pre-Op Diagnosis: Chronic tonsillitis [J35.01]  Recurrent acute tonsillitis [J03.91]  Hypertrophy of tonsils alone [J35.1]    Post-Op Diagnosis: Same       Procedure(s):  TONSILLECTOMY    Surgeon(s):  Anali Menjivar DO    Assistant:  * No surgical staff found *    Anesthesia: General    Estimated Blood Loss (mL): Minimal    Complications: None    Specimens:   ID Type Source Tests Collected by Time Destination   A : A.  BILATERAL TONSILS  Tissue Tissue SURGICAL PATHOLOGY Anali Menjivar DO 9/12/2022 1436        Implants:  * No implants in log *      Drains: * No LDAs found *    Findings: see op note    Electronically signed by Anali Menjivar DO on 9/12/2022 at 2:53 PM

## 2022-09-12 NOTE — H&P
Christopher Yates    [id number]    [facility] Same Day Surgery Update H & P  Department of General Surgery   Surgical Service   Pre-operative History and Physical  Last H & P within the last 30 days. DIAGNOSIS:   Chronic tonsillitis [J35.01]  Recurrent acute tonsillitis [J03.91]  Hypertrophy of tonsils alone [J35.1]    Procedure(s):  TONSILLECTOMY    History obtained from: Patient interview and EHR      HISTORY OF PRESENT ILLNESS:   The patient is a 23 y.o. female with c/o multiple year history recurrent tonsillitis presents today for the above procedure. Illness Screening: Patient denies fever, chills, worsening cough, or close contact with sick individuals. Past Medical History:        Diagnosis Date    Anesthesia     woke up during arm surgery and felt like it was hard to breath as going to sleep for septoplasty    Hypertension     was R/T birth control, no issues since stopped BCP    Tonsillitis      Past Surgical History:        Procedure Laterality Date    ARM SURGERY Left 2007    SEPTOPLASTY  2016       Medications Prior to Admission:      Prior to Admission medications    Medication Sig Start Date End Date Taking? Authorizing Provider   vitamin D3 (CHOLECALCIFEROL) 25 MCG (1000 UT) TABS tablet Take 1 tablet by mouth daily 1/21/22   Frankey Slice, MD         Allergies:  Patient has no known allergies.     PHYSICAL EXAM:      BP (!) 131/97   Pulse (!) 108   Temp 98.6 °F (37 °C) (Temporal)   Ht 4' 11\" (1.499 m)   Wt 114 lb 6.4 oz (51.9 kg)   LMP 08/01/2022   SpO2 98%   BMI 23.11 kg/m²      Airway:  Airway patent with no audible stridor    Heart:  Tachycardic, Regular rhythm, No murmur noted    Lungs:  No increased work of breathing, good air exchange, clear to auscultation bilaterally, no crackles or wheezing    Abdomen:  Soft, non-distended, non-tender, no rebound tenderness or guarding, and no masses palpated    ASSESSMENT AND PLAN     Patient is a 23 y.o. female with above specified procedure planned. 1.  The patients history and physical was obtained and signed off by the pre-admission testing department. Patient seen and focused exam done today- no new changes since last physical exam on 8/29/22    2. Access to ancillary services are available per request of the provider.     MELVIN Bingham - MORENITA     9/12/2022

## 2022-09-12 NOTE — PROGRESS NOTES
Pt was admitted at this time. Awake. Dr. Mitra Sandoval said pt was given Narcan to wake up prior to arriving. Spitting up a lot of thick clear mucous. Given yankauer to help. VSS on monitor. Report was given

## 2022-09-12 NOTE — PROGRESS NOTES
Ambulatory Surgery/Procedure Discharge Note    Vitals:    09/12/22 1709   BP: 129/86   Pulse: 97   Resp: 12   Temp: 97 °F (36.1 °C)   SpO2: 98%       In: 470 [P.O.:300; I.V.:170]  Out: -     Restroom use offered before discharge. Yes    Pain assessment:  none  Pain Level: 0    Pt and family states \"ready to go home\". Pt alert and oriented x4. IV removed. Denies N/V or pain. Voided prior to discharge. Discharge instructions given to pt and mother with pt permission. Pt and mother verbalized understanding of all instructions. Left with all belongings and discharge instructions. 3 prescriptions had been sent to patient's pharmacy. Patient discharged to home/self care. Patient discharged via wheel chair by transporter to waiting family.        9/12/2022 5:35 PM

## 2022-09-12 NOTE — DISCHARGE INSTRUCTIONS
[facility] ENT  [facility] [address]  [address] [address] [facility]. [address]  [phone]    Post-op Tonsillectomy and Adenoidectomy    GENERAL   Expect a sore throat for 10-14 days. At days 5-7 most experience a worsening of their sore throat; this is due to the healing process and is no cause for alarm. Ear pain is common; this is actually referred pain from the throat. The throat will have thick white patches where the tonsils were, these are normal healing areas and are not a sign of infection. Bad breath is also common during the healing process. DIET   It is very important to drink plenty of fluids. Dehydration and not swallowing increase the pain and prolong the healing process. Soft foods are preferable. Avoid hot, spicy or acidic foods. Carbonated beverages tend to be uncomfortable. FEVER   Low grade fever up to 101 is normal within the first 48 hours. Fever greater than 101 should be addressed by calling the number above    BLEEDING   Small amount of blood tinged secretions in saliva or from the nose is common during the first 24 hours. Bright red brisk bleeding that fills up more than an inch in the bottom of a cup needs to be addressed by calling the above number. Additionally if comparable amounts of blood clots are being expelled please call as this is likely from swallowed blood. ACTIVITY   1 week off school/work is required following surgery. Sore throat will persist for up to 2 weeks but you may return to school/work during the second week if you feel up to it. NO strenuous activity, sports, physical education or heavy lifting for 2 weeks. Distraction from pain has been shown to improve pain scores. Any activity such as watching television, playing games, puzzles, music, or reading can help.     MEDICATIONS   Alternate the following medications every 3 hours:   Ibuprofen 600mg every 6 hours (3-4 doses a day)   Acetaminophen 500mg every 6 hours (3-4 doses a day)  Oxycodone for breakthrough pain   If pain continues to be severe enough to prevent intake of liquids, call the office. ADDITIONAL INFO   Sleep for the first few nights with your head elevated, this will help with swelling. Cool vapor bedside humidification is advisable if available. Ice packs to the neck as needed. CALL:  With any questions or concerns regarding your post-operative course    [facility]    There are potential side effects of anesthesia or sedation you may experience for the first 24 hours. These side effects include:    Confusion or Memory loss, Dizziness, or Delayed Reaction Times   [x]A responsible person should be with you for the next 24 hours. Do not operate any vehicles (automobiles, bicycles, motorcycles) or power tools or machinery for 24 hours. Do not sign any legal documents or make any legal decisions for 24 hours. Do not drink alcohol for 24 hours or while taking narcotic pain medication. Nausea    [x]Start with light diet and progress to your normal diet as you feel like eating. However, if you experience nausea or repeated episodes of vomiting which persist beyond 12-24 hours, notify your physician. Once nausea has passed, remember to keep drinking fluids. Difficulty Passing Urine  [x]Drink extra amounts of fluid today. Notify your physician if you have not urinated within 8 hours after your procedure or you feel uncomfortable. Irritated Throat from a Breathing Tube  [x]Drink extra amounts of fluid today. Lozenges may help. Muscle Aches  [x]You may experience some generalized body aches as your muscles recover from medications used to relax them during surgery. These will gradually subside. MEDICATION INSTRUCTIONS:  [x]Prescription(S) x   0  sent with you. Use as directed. When taking pain medications, you may experience the side effect of dizziness or drowsiness.   Do not drink alcohol or drive when taking these medications. [x]Give the list of your medications to your primary care physician on your next visit. Keep your med list updated and carry it with in case of emergencies. [x] Narcotic pain medications can cause the side effect of significant constipation. You may want to add a stool softener to your postoperative medication schedule or speak to your surgeon on how best to manage this side effect. NARCOTIC SAFETY:  Your pain medicine is only for you to take. Safely store your medicines. Store pills up high and out of reach of children and pets. Ensure safety caps are snapped tightly  Keep track of how many pills you have left    Unused medication can be disposed of by taking them to a drop-off box or take-back program that is authorized by the [facility]. Access to a site near you can be found on the [facility] [place] [employer] website ([address] [employer]). If you have a CPAP machine, it is very important that you use it daily during all periods of sleep and daytime rest during your recovery at home. Surgery and Anesthesia place a significant amount of stress on your body. Using your CPAP will help keep you safe and lessen the negative effects of that stress. FOLLOW-UP RECOVERY CARE:  [x]Call the office at [phone] for follow-up appointment and problems    Watch for these possible complications, symptoms, or side effects of anesthesia. Call physician if they or any other problems occur:  Signs of INFECTION   > Fever over 101°     > Redness, swelling, hardness or warmth at the operative site   >Foul smelling or cloudy drainage at the operative site   Unrelieved PAIN  Unrelieved NAUSEA  Blood soaked dressing. (Some oozing may be normal)  Inability to urinate      Numb, pale, blue, cold or tingling extremity      Physician:  Dr. Maynor Graham    The above instructions were reviewed with patient/significant other.   The following additional patient specific information was reviewed with the patient/significant other:  []Procedure/physician specific instructions  []Medication information sheet(S) including potential side effects  []Marilys egress test  []Pain Ball management  []FAQ Catheter associated blood stream infections  []FAQ Surgical Site Infections  []Other-    I have read and understand the instructions given to me: ____________________________________________   (Patient/S.O. Signature)            Date/time 9/12/2022 4:48 PM         PACU:  [phone]   M-F 700 AM - 7 PM      SAME DAY SERVICES:  [phone] M-F 7AM-6PM        If you smoke STOP. We care about your health!

## 2022-09-12 NOTE — OP NOTE
Patient Name: Aly Prather  YOB: 2000  Medical Record Number:  [id number]  Billing Number:  [id number]  Date of Procedure: 9/12/2022  Time: 7321    Pre Operative Diagnoses:  1. Recurrent acute tonsillitis 2. Chronic tonsillitis 3. Tonsillar hypertrophy. Post Operative Diagnoses:  same. Procedure:    1. Tonsillectomy           Surgeon: Jesus Reeves DO  OR Staff: Circulator: Sara Guillory RN  Scrub Person First: Joe Canela  Anesthesia:  General anesthesia. Findings:    3-4+ tonsils    Indications:  Thalia Rocha is a now 23 y.o. female with a history of recurrent strep tonsillitis, chronic tonsillitis. Recommended tonsillectomy    Description:   After verification of informed consent, the patient was brought to the operating room and placed in the supine position. General anesthesia was induced. The head of the bed was rotated 90 degrees. The patient was prepped and draped in a standard fashion. A tyshawn-tyra mouth gag was inserted into the mouth, retracted, and suspended from the bartholomew stand, exposing the oropharynx. The tonsils were 3-4+. Using a curved Allis, the right tonsil was grasped and retracted medially. Bovie electrocautery was used to make an incision in the right anterior tonsillar pillar. The tonsil was then dissected away from the tonsillar fossa using a standard extracapsular dissection technique. The tonsil was then removed and placed in a specimen cup. Suction bovie electrocautery was employed to control bleeding. After achieving hemostasis, attention was turned to the left tonsil and the same procedure was performed with similar findings. The mouth was irrigated with sterile saline. The patient was then taken off suspension, and the mouth gag released and removed from the mouth. The mouth was re-examined using a sweetheart retractor. Any residual bleeding was cauterized with suction bovie. The care of the patient was then turned back over to anesthesia.  The head of the bed rotated 90 degrees. The patient was taken off anesthesia and extubated and transferred to PACU in stable condition. There were no complications with the procedure. The patient tolerated the procedure well. Specimens:   ID Type Source Tests Collected by Time Destination   A : A. BILATERAL TONSILS  Tissue Tissue SURGICAL PATHOLOGY Azael Samuels DO 9/12/2022 1436      Estimated Blood Loss: minimal  Complications:  None. Disposition/Plan:  stable to pacu.

## 2022-09-12 NOTE — ANESTHESIA POSTPROCEDURE EVALUATION
Department of Anesthesiology  Postprocedure Note    Patient: Dianna Enriquez  MRN: [id number]  YOB: 2000  Date of evaluation: 9/12/2022      Procedure Summary     Date: 09/12/22 Room / Location: [address] [place] [address] [address] [place] [address] / [facility]    Anesthesia Start: 8059 Anesthesia Stop: 8601    Procedure: TONSILLECTOMY Diagnosis:       Chronic tonsillitis      Recurrent acute tonsillitis      Hypertrophy of tonsils alone      (Chronic tonsillitis [J35.01])      (Recurrent acute tonsillitis [J03.91])      (Hypertrophy of tonsils alone [J35.1])    Surgeons: Edgar Coleman DO Responsible Provider: Michele Yi DO    Anesthesia Type: general ASA Status: 1          Anesthesia Type: No value filed.     Gloria Phase I: Gloria Score: 10    Gloria Phase II:        Anesthesia Post Evaluation    Patient location during evaluation: PACU  Level of consciousness: awake  Complications: no  Multimodal analgesia pain management approach

## 2022-09-12 NOTE — PROGRESS NOTES
PACU Transfer to [facility]    Vitals:    09/12/22 1657   BP:    Pulse: 88   Resp:    Temp:    SpO2:          Intake/Output Summary (Last 24 hours) at 9/12/2022 1659  Last data filed at 9/12/2022 1658  Gross per 24 hour   Intake 470 ml   Output --   Net 470 ml       Pain assessment:  present - adequately treated  Pain Level: 5    Patient transferred to care of [facility] RN.    9/12/2022 4:59 PM

## 2022-09-12 NOTE — ADDENDUM NOTE
Addendum  created 09/12/22 1627 by Ramiro Rebolledo MD    Order list changed, Order sets accessed, Pharmacy for encounter modified

## 2022-09-12 NOTE — ANESTHESIA PRE PROCEDURE
Department of Anesthesiology  Preprocedure Note       Name:  Anali Curiel   Age:  23 y. o.  :  2000                                          MRN:  [id number]         Date:  2022      Surgeon: Lurdes Sylvester):  Jeremy Bruce DO    Procedure: Procedure(s):  TONSILLECTOMY    Medications prior to admission:   Prior to Admission medications    Medication Sig Start Date End Date Taking? Authorizing Provider   vitamin D3 (CHOLECALCIFEROL) 25 MCG (1000 UT) TABS tablet Take 1 tablet by mouth daily 22   Oz Lam MD       Current medications:    Current Facility-Administered Medications   Medication Dose Route Frequency Provider Last Rate Last Admin    lactated ringers infusion   IntraVENous Continuous Jean-Pierre Claire MD        sodium chloride flush 0.9 % injection 5-40 mL  5-40 mL IntraVENous 2 times per day Jean-Pierre Claire MD        sodium chloride flush 0.9 % injection 5-40 mL  5-40 mL IntraVENous PRN Jean-Pierre Claire MD        0.9 % sodium chloride infusion   IntraVENous PRN Jean-Pierre Claire MD           Allergies:  No Known Allergies    Problem List:    Patient Active Problem List   Diagnosis Code    Family history of thyroid disease Z80.51    History of vitamin D deficiency Z86.39       Past Medical History:        Diagnosis Date    Anesthesia     woke up during arm surgery and felt like it was hard to breath as going to sleep for septoplasty    Bilateral pneumonia     when pt was in high school -- both lungs per pt report    Hypertension     was R/T birth control, no issues since stopped BCP    Tachycardia     pt states she has a fast heart rate at times    Tonsillitis        Past Surgical History:        Procedure Laterality Date    ARM SURGERY Right 2007    right arm both bones broken just above wrist    SEPTOPLASTY  2016       Social History:    Social History     Tobacco Use    Smoking status: Never    Smokeless tobacco: Never   Substance Use Topics    Alcohol use:  Yes Comment: 3-5 drinks per weeks                                Counseling given: Not Answered      Vital Signs (Current):   Vitals:    09/02/22 1425 09/12/22 1208   BP:  (!) 131/97   Pulse:  (!) 108   Resp:  18   Temp:  98.6 °F (37 °C)   TempSrc:  Temporal   SpO2:  98%   Weight: 115 lb (52.2 kg) 114 lb 6.4 oz (51.9 kg)   Height: 4' 11\" (1.499 m) 4' 11\" (1.499 m)                                              BP Readings from Last 3 Encounters:   09/12/22 (!) 131/97   08/29/22 133/87   08/27/22 (!) 148/85       NPO Status: Time of last liquid consumption: 0000                        Time of last solid consumption: 2130                        Date of last liquid consumption: 09/12/22                        Date of last solid food consumption: 09/11/22    BMI:   Wt Readings from Last 3 Encounters:   09/12/22 114 lb 6.4 oz (51.9 kg)   08/29/22 115 lb 12.8 oz (52.5 kg)   08/27/22 116 lb (52.6 kg)     Body mass index is 23.11 kg/m². CBC:   Lab Results   Component Value Date/Time    WBC 7.2 08/29/2022 03:47 PM    RBC 4.32 08/29/2022 03:47 PM    HGB 12.5 08/29/2022 03:47 PM    HCT 38.4 08/29/2022 03:47 PM    MCV 88.9 08/29/2022 03:47 PM    RDW 13.9 08/29/2022 03:47 PM     08/29/2022 03:47 PM       CMP:   Lab Results   Component Value Date/Time     08/29/2022 03:47 PM    K 4.3 08/29/2022 03:47 PM    K 3.5 08/27/2022 04:16 PM     08/29/2022 03:47 PM    CO2 27 08/29/2022 03:47 PM    BUN 9 08/29/2022 03:47 PM    CREATININE 0.6 08/29/2022 03:47 PM    GFRAA >60 08/29/2022 03:47 PM    AGRATIO 1.7 08/29/2022 03:47 PM    LABGLOM >60 08/29/2022 03:47 PM    GLUCOSE 92 08/29/2022 03:47 PM    PROT 7.3 08/29/2022 03:47 PM    CALCIUM 10.0 08/29/2022 03:47 PM    BILITOT <0.2 08/29/2022 03:47 PM    ALKPHOS 67 08/29/2022 03:47 PM    AST 41 08/29/2022 03:47 PM    ALT 42 08/29/2022 03:47 PM       POC Tests: No results for input(s): POCGLU, POCNA, POCK, POCCL, POCBUN, POCHEMO, POCHCT in the last 72 hours.     Coags:   Lab Results   Component Value Date/Time    PROTIME 12.7 08/29/2022 03:47 PM    INR 0.96 08/29/2022 03:47 PM    APTT 30.3 08/29/2022 03:47 PM       HCG (If Applicable):   Lab Results   Component Value Date    PREGTESTUR Negative 08/27/2022        ABGs: No results found for: PHART, PO2ART, XAG9QYL, UHO7TEU, BEART, V0NPHXWB     Type & Screen (If Applicable):  No results found for: LABABO, LABRH    Drug/Infectious Status (If Applicable):  No results found for: HIV, HEPCAB    COVID-19 Screening (If Applicable): No results found for: COVID19        Anesthesia Evaluation  Patient summary reviewed and Nursing notes reviewed history of anesthetic complications:   Airway: Mallampati: II  TM distance: >3 FB   Neck ROM: full  Mouth opening: > = 3 FB   Dental: normal exam         Pulmonary:normal exam        (-) pneumonia                           Cardiovascular:  Exercise tolerance: good (>4 METS),   (+) hypertension:,     (-)  angina, orthopnea and PND      Rhythm: regular  Rate: normal           Beta Blocker:  Not on Beta Blocker         Neuro/Psych:               GI/Hepatic/Renal:             Endo/Other:                     Abdominal:         (-) obese Abdomen: soft. Vascular: Other Findings:           Anesthesia Plan      general     ASA 1       Induction: intravenous. MIPS: Postoperative opioids intended and Prophylactic antiemetics administered. Anesthetic plan and risks discussed with patient. Plan discussed with CRNA.                     Supa Piña DO   9/12/2022

## 2022-09-23 RX ORDER — IBUPROFEN 600 MG/1
TABLET ORAL
Qty: 60 TABLET | Refills: 0 | Status: SHIPPED | OUTPATIENT
Start: 2022-09-23

## 2022-10-04 ENCOUNTER — OFFICE VISIT (OUTPATIENT)
Dept: ENT CLINIC | Age: 22
End: 2022-10-04

## 2022-10-04 DIAGNOSIS — J35.01 CHRONIC TONSILLITIS: ICD-10-CM

## 2022-10-04 DIAGNOSIS — J03.91 ACUTE RECURRENT TONSILLITIS: Primary | ICD-10-CM

## 2022-10-04 DIAGNOSIS — J35.1 TONSILLAR HYPERTROPHY: ICD-10-CM

## 2022-10-04 PROCEDURE — 99024 POSTOP FOLLOW-UP VISIT: CPT | Performed by: OTOLARYNGOLOGY

## 2022-10-04 NOTE — PROGRESS NOTES
[facility]  [address], [address]  P: [phone]  F: [phone]       Patient     Chastity Taylor  2000    ChiefComplaint     Chief Complaint   Patient presents with    Post-Op Check     Patient is here for a post op check. States she is doing well and denies concerns. History of Present Illness     Chastity Taylor is a pleasant 23 y.o. female here for 3-week postop visit for tonsillectomy. Overall doing well. No bleeding. Pain improved. Tolerating regular diet.     Past Medical History     Past Medical History:   Diagnosis Date    Anesthesia     woke up during arm surgery and felt like it was hard to breath as going to sleep for septoplasty    Bilateral pneumonia     when pt was in high school -- both lungs per pt report    COVID-19     Hypertension     was R/T birth control, no issues since stopped BCP    Tachycardia     pt states she has a fast heart rate at times    Tonsillitis        Past Surgical History     Past Surgical History:   Procedure Laterality Date    ARM SURGERY Right 2007    right arm both bones broken just above wrist    SEPTOPLASTY  2016    TONSILLECTOMY N/A 9/12/2022    TONSILLECTOMY performed by Tony Santana DO at [facility] OR       Family History     Family History   Problem Relation Age of Onset    Hyperthyroidism Mother     Diabetes Maternal Grandmother     Diabetes Maternal Grandfather     Diabetes Paternal Grandmother     Diabetes Paternal Grandfather        Social History     Social History     Socioeconomic History    Marital status: Single     Spouse name: Not on file    Number of children: Not on file    Years of education: Not on file    Highest education level: Not on file   Occupational History    Not on file   Tobacco Use    Smoking status: Never    Smokeless tobacco: Never   Vaping Use    Vaping Use: Never used   Substance and Sexual Activity    Alcohol use: Yes     Comment: 3-5 drinks per weeks    Drug use: Never Sexual activity: Yes     Partners: Male   Other Topics Concern    Not on file   Social History Narrative    Not on file     Social Determinants of Health     Financial Resource Strain: Low Risk     Difficulty of Paying Living Expenses: Not hard at all   Food Insecurity: No Food Insecurity    Worried About Running Out of Food in the Last Year: Never true    Ran Out of Food in the Last Year: Never true   Transportation Needs: Not on file   Physical Activity: Not on file   Stress: Not on file   Social Connections: Not on file   Intimate Partner Violence: Not on file   Housing Stability: Not on file       Allergies     No Known Allergies    Medications     Current Outpatient Medications   Medication Sig Dispense Refill    ibuprofen (ADVIL;MOTRIN) 600 MG tablet TAKE ONE TABLET BY MOUTH EVERY 6 HOURS AS NEEDED FOR PAIN, ALTERNATE EVERY 3 HOURS WITH ACETAMINOPHEN 60 tablet 0    acetaminophen (TYLENOL) 500 MG tablet Take 1 tablet by mouth every 6 hours as needed for Pain Alternate every 3 hours with ibuprofen 60 tablet 0    vitamin D3 (CHOLECALCIFEROL) 25 MCG (1000 UT) TABS tablet Take 1 tablet by mouth daily 90 tablet 1     No current facility-administered medications for this visit. Review of Systems     Review of Systems      PhysicalExam     There were no vitals filed for this visit. Physical Exam  Tonsillar fossa healing well bilaterally. Procedure           Assessment and Plan     1. Acute recurrent tonsillitis  Patient doing well status post tonsillectomy. Return to normal activity. Follow-up as needed    2. Chronic tonsillitis      3. Tonsillar hypertrophy      Return if symptoms worsen or fail to improve. Portions of this note were dictated using Dragon.  There may be linguistic errors secondary to the use of this program.

## 2022-10-09 NOTE — PROGRESS NOTES
[address], [place]                                                           [address] [place] [address] 51, [address] [place] [address]                                                             [phone] (P) [phone] (F)      Note is transcribed using voice recognition software. Inadvertent computerized transcription errors may be present. Patient identification: Javed Blanchard, female : 2000,22 y.o.        ASSESSMENT AND PLAN:  Joo Best was seen today for establish care and abnormal test results. Diagnoses and all orders for this visit:    Other migraine without status migrainosus, not intractable    Screening for rheumatic disorder    Elevated C-reactive protein (CRP)        Mildly elevated CRP( 3 x normal ) from 2022,  normal sedimentation rate-does not have any symptoms, exam findings and other objective markers to suggest autoimmune rheumatic disorders. Reassured patient-no autoimmune rheumatic disorders. Other symptoms- likely related from migraine headaches including fatigue, unwell feeling, chronic headaches. She is followed by neurologist, note reviewed, started propranolol. She has been taking rizatriptan almost daily. We discussed benefits of regular exercises, good quality of sleep, and avoiding migraine triggers. Recommend following up with neurologist.  She did have recent labs and neurologist, will obtain a copy. Patient has borderline elevated AST, ALT, could be related from alcohol use. Advised patient to avoid alcohol and recheck the test at primary care provider's office. Patient indicates understanding and agrees with the management plan.     #################################################################################################      REASON FOR CONSULTATION:  Patient is being seen at the request of  Dominick Younger MD / Brenna Andino MD for evaluation of elevated creatinine, normal sedimentation rate and fatigue. HISTORY OF PRESENT ILLNESS:  23 y.o. female states that she has been experiencing headache for last 2 years, followed by neurology, has diagnosis of migraine headaches, has been taking rizatriptan almost every day, recently started propanolol XL. Patient tells me that her blood pressure is running high since February this year. States that she just does not feel well, feels tired, spacey, always has headaches, and is affecting her quality of life significantly. She is to exercise regularly in the past, now walking a mile causes sores in her legs. No swollen or inflamed joints. With running and exercises, sometimes she gets red spot in front of her knees. Lab work-mild elevation in CRP, normal sedimentation rate, mildly elevated AST ALT. Saw neurology, labs were repeated, I do not have access to recent labs. Pertinent ROS: Denies weight loss, objective fever, skin rashes or skin thickening, photosensitivity Raynauds, focal alopecia, recurrent ocular congestion, dry eyes or mouth, or mucosal ulcers, tinnitus or recent hearing loss. Denies chest pain, palpitations, cough, pleurisy, dysphagia, or features of inflammatory bowel diseases. No h/o blood clots or bleeding disorders. No renal or genitourinary problems. No focal weakness or persistent paresthesia. All other ROS are negative. PMH, PSH,Social history , Meds reviewed. FH-no family history of autoimmune rheumatic disorders. PHYSICAL EXAM:    Vitals:    /78   Ht 4' 11\" (1.499 m)   Wt 113 lb (51.3 kg)   BMI 22.82 kg/m²   AA)x3 well nourished, and well groomed, normal judgement. MKS:Normal musculoskeletal exam in upper, lower extremities and spine  No fibromyalgia tender points. Skin: No rashes, no induration or skin thickening or nodules.    HEENT: Normal lids, lacrimal glands and pupils. No oral or nasal ulcers. Salivary glands reveal no evidence of abnormality. External inspection of the ears and nose within normal limits. Neck: Supple no adenopathy  Chest: Normal effort  Heart: S1-S2 regular no added sounds. Abdomen:   Lymph nodes:   Neurologic:     DATA:   .latcb  Lab Results   Component Value Date     08/29/2022    K 4.3 08/29/2022     08/29/2022    CO2 27 08/29/2022    BUN 9 08/29/2022    CREATININE 0.6 08/29/2022    GLUCOSE 92 08/29/2022    CALCIUM 10.0 08/29/2022    PROT 7.3 08/29/2022    LABALBU 4.6 08/29/2022    BILITOT <0.2 08/29/2022    ALKPHOS 67 08/29/2022    AST 41 (H) 08/29/2022    ALT 42 (H) 08/29/2022    LABGLOM >60 08/29/2022    GFRAA >60 08/29/2022    AGRATIO 1.7 08/29/2022       Lab Results   Component Value Date    SEDRATE 9 02/22/2022     Lab Results   Component Value Date    CRP 15.6 (H) 02/22/2022     Lab Results   Component Value Date    WBC 7.2 08/29/2022    HGB 12.5 08/29/2022    HCT 38.4 08/29/2022    MCV 88.9 08/29/2022     08/29/2022           Total time 60  minutes that includes the following-  Preparing to see the patient such as reviewing patients records, pre-charting, preparing the visit on the same day, performing a medically appropriate history and physical examination, counseling and educating patient about diagnosis, management plan, ordering appropriate testings, prescriptions, communicating findings to other care providers, and documenting clinical information in electronic medical record. I thank you for giving me the opportunity to be involved in [facility] care and I look forward following [facility] along with you. If you have any questions or concerns please feel free to contact me at any time.   Alaina Sosa MD 10/11/22

## 2022-10-11 ENCOUNTER — OFFICE VISIT (OUTPATIENT)
Dept: RHEUMATOLOGY | Age: 22
End: 2022-10-11
Payer: COMMERCIAL

## 2022-10-11 VITALS
BODY MASS INDEX: 22.78 KG/M2 | SYSTOLIC BLOOD PRESSURE: 110 MMHG | DIASTOLIC BLOOD PRESSURE: 78 MMHG | WEIGHT: 113 LBS | HEIGHT: 59 IN

## 2022-10-11 DIAGNOSIS — G43.809 OTHER MIGRAINE WITHOUT STATUS MIGRAINOSUS, NOT INTRACTABLE: Primary | ICD-10-CM

## 2022-10-11 DIAGNOSIS — R79.82 ELEVATED C-REACTIVE PROTEIN (CRP): ICD-10-CM

## 2022-10-11 DIAGNOSIS — Z13.89 SCREENING FOR RHEUMATIC DISORDER: ICD-10-CM

## 2022-10-11 PROCEDURE — 99204 OFFICE O/P NEW MOD 45 MIN: CPT | Performed by: INTERNAL MEDICINE

## 2022-10-11 RX ORDER — RIZATRIPTAN BENZOATE 10 MG/1
10 TABLET ORAL
COMMUNITY

## 2022-10-11 RX ORDER — PROPRANOLOL HCL 60 MG
CAPSULE, EXTENDED RELEASE 24HR ORAL DAILY
COMMUNITY

## 2022-10-24 ENCOUNTER — OFFICE VISIT (OUTPATIENT)
Dept: GYNECOLOGY | Age: 22
End: 2022-10-24
Payer: COMMERCIAL

## 2022-10-24 VITALS
WEIGHT: 115.6 LBS | OXYGEN SATURATION: 98 % | HEART RATE: 78 BPM | DIASTOLIC BLOOD PRESSURE: 78 MMHG | RESPIRATION RATE: 17 BRPM | BODY MASS INDEX: 23.31 KG/M2 | SYSTOLIC BLOOD PRESSURE: 123 MMHG | HEIGHT: 59 IN

## 2022-10-24 DIAGNOSIS — Z01.419 WELL WOMAN EXAM WITH ROUTINE GYNECOLOGICAL EXAM: Primary | ICD-10-CM

## 2022-10-24 DIAGNOSIS — N92.6 IRREGULAR MENSTRUAL BLEEDING: ICD-10-CM

## 2022-10-24 PROCEDURE — 99385 PREV VISIT NEW AGE 18-39: CPT | Performed by: OBSTETRICS & GYNECOLOGY

## 2022-10-26 LAB
C TRACH DNA GENITAL QL NAA+PROBE: NEGATIVE
N. GONORRHOEAE DNA: NEGATIVE

## 2022-11-02 RX ORDER — FLUCONAZOLE 150 MG/1
150 TABLET ORAL ONCE
Qty: 1 TABLET | Refills: 1 | OUTPATIENT
Start: 2022-11-02 | End: 2022-11-02

## 2022-11-02 ASSESSMENT — ENCOUNTER SYMPTOMS
RESPIRATORY NEGATIVE: 1
ALLERGIC/IMMUNOLOGIC NEGATIVE: 1
EYES NEGATIVE: 1
GASTROINTESTINAL NEGATIVE: 1

## 2022-11-03 NOTE — PROGRESS NOTES
Subjective:      Patient ID: Keke Limon is a 23 y.o. female. Patient is here for annual. Patient states her cycles have been irregular since off ocps. Gynecologic Exam      Review of Systems   Constitutional: Negative. HENT: Negative. Eyes: Negative. Respiratory: Negative. Cardiovascular: Negative. Gastrointestinal: Negative. Endocrine: Negative. Genitourinary:  Positive for menstrual problem. Musculoskeletal: Negative. Skin: Negative. Allergic/Immunologic: Negative. Neurological: Negative. Hematological: Negative. Psychiatric/Behavioral: Negative.        Date of Birth 2000  Past Medical History:   Diagnosis Date    Anesthesia     woke up during arm surgery and felt like it was hard to breath as going to sleep for septoplasty    Bilateral pneumonia     when pt was in high school -- both lungs per pt report    COVID-19     Hypertension     was R/T birth control, no issues since stopped BCP    Menorrhagia     Tachycardia     pt states she has a fast heart rate at times    Tonsillitis      Past Surgical History:   Procedure Laterality Date    ARM SURGERY Right 2007    right arm both bones broken just above wrist    SEPTOPLASTY  2016    TONSILLECTOMY N/A 2022    TONSILLECTOMY performed by Abena Iniguez DO at [address]     OB History    Para Term  AB Living   0 0 0 0 0 0   SAB IAB Ectopic Molar Multiple Live Births   0 0 0 0 0 0     Social History     Socioeconomic History    Marital status: Single     Spouse name: Not on file    Number of children: Not on file    Years of education: Not on file    Highest education level: Not on file   Occupational History    Not on file   Tobacco Use    Smoking status: Never    Smokeless tobacco: Never   Vaping Use    Vaping Use: Never used   Substance and Sexual Activity    Alcohol use: Yes     Comment: 3-5 drinks per weeks    Drug use: Never    Sexual activity: Yes     Partners: Male   Other Topics Concern Not on file   Social History Narrative    Not on file     Social Determinants of Health     Financial Resource Strain: Low Risk     Difficulty of Paying Living Expenses: Not hard at all   Food Insecurity: No Food Insecurity    Worried About Running Out of Food in the Last Year: Never true    Ran Out of Food in the Last Year: Never true   Transportation Needs: Not on file   Physical Activity: Not on file   Stress: Not on file   Social Connections: Not on file   Intimate Partner Violence: Not on file   Housing Stability: Not on file     No Known Allergies  Outpatient Medications Marked as Taking for the 10/24/22 encounter (Office Visit) with Xavi Bains MD   Medication Sig Dispense Refill    propranolol (INDERAL LA) 60 MG extended release capsule Take by mouth daily      rizatriptan (MAXALT) 10 MG tablet Take 10 mg by mouth once as needed for Migraine May repeat in 2 hours if needed      vitamin D3 (CHOLECALCIFEROL) 25 MCG (1000 UT) TABS tablet Take 1 tablet by mouth daily 90 tablet 1     Family History   Problem Relation Age of Onset    Hyperthyroidism Mother     Diabetes Maternal Grandmother     Diabetes Maternal Grandfather     Diabetes Paternal Grandmother     Diabetes Paternal Grandfather      /78 (Site: Right Upper Arm, Position: Sitting, Cuff Size: Medium Adult)   Pulse 78   Resp 17   Ht 4' 11\" (1.499 m)   Wt 115 lb 9.6 oz (52.4 kg)   LMP 08/29/2022   SpO2 98%   BMI 23.35 kg/m²       Objective:   Physical Exam  Constitutional:       Appearance: Normal appearance. She is well-developed and normal weight. HENT:      Head: Normocephalic and atraumatic. Nose: Nose normal.      Mouth/Throat:      Mouth: Mucous membranes are moist.      Pharynx: Oropharynx is clear. Eyes:      Extraocular Movements: Extraocular movements intact. Neck:      Thyroid: No thyromegaly. Cardiovascular:      Rate and Rhythm: Normal rate and regular rhythm. Pulses: Normal pulses.       Heart sounds: Normal heart sounds. No murmur heard. No friction rub. No gallop. Pulmonary:      Effort: Pulmonary effort is normal. No respiratory distress. Breath sounds: Normal breath sounds. No stridor. No wheezing, rhonchi or rales. Chest:      Chest wall: No tenderness. Breasts:     Right: Normal. No swelling, bleeding, inverted nipple, mass, nipple discharge, skin change or tenderness. Left: Normal. No swelling, bleeding, inverted nipple, mass, nipple discharge, skin change or tenderness. Abdominal:      General: Bowel sounds are normal. There is no distension. Palpations: Abdomen is soft. There is no mass. Tenderness: There is no abdominal tenderness. There is no guarding or rebound. Hernia: No hernia is present. There is no hernia in the left inguinal area or right inguinal area. Genitourinary:     General: Normal vulva. Exam position: Lithotomy position. Pubic Area: No rash. Labia:         Right: No rash, tenderness, lesion or injury. Left: No rash, tenderness, lesion or injury. Urethra: No prolapse, urethral pain, urethral swelling or urethral lesion. Vagina: No signs of injury and foreign body. No vaginal discharge, erythema, tenderness, bleeding, lesions or prolapsed vaginal walls. Cervix: No cervical motion tenderness, discharge, friability, lesion, erythema, cervical bleeding or eversion. Uterus: Not deviated, not enlarged, not fixed, not tender and no uterine prolapse. Adnexa:         Right: No mass, tenderness or fullness. Left: No mass, tenderness or fullness. Rectum: No anal fissure or external hemorrhoid. Comments: Normal urethral meatus, normal urethra, nl bladder  Musculoskeletal:         General: No swelling, tenderness, deformity or signs of injury. Normal range of motion. Cervical back: Normal range of motion and neck supple. No rigidity. Lymphadenopathy:      Cervical: No cervical adenopathy. Lower Body: No right inguinal adenopathy. No left inguinal adenopathy. Skin:     General: Skin is warm and dry. Coloration: Skin is not jaundiced or pale. Findings: No bruising, erythema, lesion or rash. Neurological:      General: No focal deficit present. Mental Status: She is alert and oriented to person, place, and time. Mental status is at baseline. Deep Tendon Reflexes: Reflexes are normal and symmetric. Psychiatric:         Mood and Affect: Mood normal.         Behavior: Behavior normal.         Thought Content: Thought content normal.         Judgment: Judgment normal.       Assessment:      Annual  Irregular menses      Plan:      Pap, calcium, exercise, dna  Check labs. Could still be due to not quite 6 months off ocps. Will see how she feels.          Vera Angel MD

## 2023-06-19 DIAGNOSIS — N92.6 IRREGULAR MENSTRUAL BLEEDING: ICD-10-CM

## 2023-06-20 LAB
ESTRADIOL SERPL-MCNC: 182 PG/ML
FSH SERPL-ACNC: 6.8 MIU/ML
PROLACTIN SERPL IA-MCNC: 18.5 NG/ML

## 2023-06-21 LAB
DHEA-S SERPL-MCNC: 226 UG/DL (ref 65–380)
TESTOST SERPL-MCNC: 48 NG/DL (ref 20–70)

## 2023-10-19 ENCOUNTER — OFFICE VISIT (OUTPATIENT)
Dept: PRIMARY CARE CLINIC | Age: 23
End: 2023-10-19
Payer: COMMERCIAL

## 2023-10-19 VITALS
OXYGEN SATURATION: 98 % | HEIGHT: 59 IN | HEART RATE: 86 BPM | SYSTOLIC BLOOD PRESSURE: 134 MMHG | WEIGHT: 127.6 LBS | BODY MASS INDEX: 25.72 KG/M2 | TEMPERATURE: 97.5 F | DIASTOLIC BLOOD PRESSURE: 85 MMHG

## 2023-10-19 DIAGNOSIS — R12 HEARTBURN: ICD-10-CM

## 2023-10-19 DIAGNOSIS — I10 ESSENTIAL HYPERTENSION: ICD-10-CM

## 2023-10-19 DIAGNOSIS — Z86.69 HISTORY OF MIGRAINE HEADACHES: ICD-10-CM

## 2023-10-19 DIAGNOSIS — K58.2 IRRITABLE BOWEL SYNDROME WITH BOTH CONSTIPATION AND DIARRHEA: Primary | ICD-10-CM

## 2023-10-19 PROCEDURE — 3078F DIAST BP <80 MM HG: CPT | Performed by: FAMILY MEDICINE

## 2023-10-19 PROCEDURE — 3074F SYST BP LT 130 MM HG: CPT | Performed by: FAMILY MEDICINE

## 2023-10-19 PROCEDURE — 99214 OFFICE O/P EST MOD 30 MIN: CPT | Performed by: FAMILY MEDICINE

## 2023-10-19 SDOH — HEALTH STABILITY: PHYSICAL HEALTH: ON AVERAGE, HOW MANY MINUTES DO YOU ENGAGE IN EXERCISE AT THIS LEVEL?: 50 MIN

## 2023-10-19 SDOH — HEALTH STABILITY: PHYSICAL HEALTH: ON AVERAGE, HOW MANY DAYS PER WEEK DO YOU ENGAGE IN MODERATE TO STRENUOUS EXERCISE (LIKE A BRISK WALK)?: 2 DAYS

## 2023-10-19 SDOH — ECONOMIC STABILITY: INCOME INSECURITY: HOW HARD IS IT FOR YOU TO PAY FOR THE VERY BASICS LIKE FOOD, HOUSING, MEDICAL CARE, AND HEATING?: NOT HARD AT ALL

## 2023-10-19 SDOH — ECONOMIC STABILITY: HOUSING INSECURITY
IN THE LAST 12 MONTHS, WAS THERE A TIME WHEN YOU DID NOT HAVE A STEADY PLACE TO SLEEP OR SLEPT IN A SHELTER (INCLUDING NOW)?: NO

## 2023-10-19 SDOH — ECONOMIC STABILITY: FOOD INSECURITY: WITHIN THE PAST 12 MONTHS, THE FOOD YOU BOUGHT JUST DIDN'T LAST AND YOU DIDN'T HAVE MONEY TO GET MORE.: NEVER TRUE

## 2023-10-19 SDOH — ECONOMIC STABILITY: FOOD INSECURITY: WITHIN THE PAST 12 MONTHS, YOU WORRIED THAT YOUR FOOD WOULD RUN OUT BEFORE YOU GOT MONEY TO BUY MORE.: NEVER TRUE

## 2023-10-19 ASSESSMENT — PATIENT HEALTH QUESTIONNAIRE - PHQ9
SUM OF ALL RESPONSES TO PHQ QUESTIONS 1-9: 0
SUM OF ALL RESPONSES TO PHQ QUESTIONS 1-9: 0
1. LITTLE INTEREST OR PLEASURE IN DOING THINGS: 0
SUM OF ALL RESPONSES TO PHQ QUESTIONS 1-9: 0
SUM OF ALL RESPONSES TO PHQ9 QUESTIONS 1 & 2: 0
2. FEELING DOWN, DEPRESSED OR HOPELESS: 0
SUM OF ALL RESPONSES TO PHQ QUESTIONS 1-9: 0

## 2023-10-19 ASSESSMENT — SOCIAL DETERMINANTS OF HEALTH (SDOH)

## 2023-10-19 NOTE — PROGRESS NOTES
[facility]. PRIMARY CARE  [address]  [address] [place] [address]  Dept: [phone]  Dept Fax: [phone]     10/19/2023      Soto Pinzon   2000     Chief Complaint   Patient presents with    New Patient     Digestive issues changing on bowels. 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 Wants to discuss chronic GI issues. Has episodes of loose stools. With harder stools, sometimes there is a little blood when wiping. Has a lot of belching. Sometimes abd bloating. No previous discussion with anyone, or treatment for this. In addition to this, she does have HTN taking Propranolol. She also has migraines, so this helps with that. She is followed by Neuro for this. 10/19/2023     9:01 AM 1/18/2022     4:38 PM   PHQ Scores   PHQ2 Score 0 0   PHQ9 Score 0 0     Interpretation of Total Score Depression Severity: 1-4 = Minimal depression, 5-9 = Mild depression, 10-14 = Moderate depression, 15-19 = Moderately severe depression, 20-27 = Severe depression     Prior to Visit Medications    Medication Sig Taking?  Authorizing Provider   vitamin D 25 MCG (1000 UT) CAPS Take 1 capsule by mouth daily Yes ProviderGris MD   propranolol (INDERAL LA) 60 MG extended release capsule Take by mouth daily Yes Gris Portillo MD   rizatriptan (MAXALT) 10 MG tablet Take 10 mg by mouth once as needed for Migraine May repeat in 2 hours if needed Yes ProviderGris MD       Past Medical History:   Diagnosis Date    Essential hypertension 10/19/2023    History of migraine headaches 10/19/2023        Social History     Tobacco Use    Smoking status: Never    Smokeless tobacco: Never   Vaping Use    Vaping Use: Never used   Substance Use Topics    Alcohol use: Yes     Comment: 3-5 drinks per weeks    Drug use: Never        Past Surgical History:   Procedure Laterality Date    ARM SURGERY Right 2007    right arm both bones broken just above wrist    SEPTOPLASTY  2016

## 2023-10-30 ENCOUNTER — OFFICE VISIT (OUTPATIENT)
Dept: GYNECOLOGY | Age: 23
End: 2023-10-30
Payer: COMMERCIAL

## 2023-10-30 VITALS
SYSTOLIC BLOOD PRESSURE: 134 MMHG | DIASTOLIC BLOOD PRESSURE: 78 MMHG | RESPIRATION RATE: 17 BRPM | HEART RATE: 62 BPM | BODY MASS INDEX: 24.8 KG/M2 | HEIGHT: 59 IN | OXYGEN SATURATION: 99 % | WEIGHT: 123 LBS

## 2023-10-30 DIAGNOSIS — Z01.419 WELL WOMAN EXAM WITH ROUTINE GYNECOLOGICAL EXAM: Primary | ICD-10-CM

## 2023-10-30 PROCEDURE — 99395 PREV VISIT EST AGE 18-39: CPT | Performed by: OBSTETRICS & GYNECOLOGY

## 2023-10-30 PROCEDURE — 3078F DIAST BP <80 MM HG: CPT | Performed by: OBSTETRICS & GYNECOLOGY

## 2023-10-30 PROCEDURE — 3075F SYST BP GE 130 - 139MM HG: CPT | Performed by: OBSTETRICS & GYNECOLOGY

## 2023-10-30 ASSESSMENT — ENCOUNTER SYMPTOMS
EYES NEGATIVE: 1
ALLERGIC/IMMUNOLOGIC NEGATIVE: 1
GASTROINTESTINAL NEGATIVE: 1
RESPIRATORY NEGATIVE: 1

## 2023-10-31 NOTE — PROGRESS NOTES
Subjective:      Patient ID: Remi Garduno is a 20 y.o. female. Patient is here for annual.     Gynecologic Exam        Review of Systems   Constitutional: Negative. HENT: Negative. Eyes: Negative. Respiratory: Negative. Cardiovascular: Negative. Gastrointestinal: Negative. Endocrine: Negative. Genitourinary: Negative. Musculoskeletal: Negative. Skin: Negative. Allergic/Immunologic: Negative. Neurological: Negative. Hematological: Negative. Psychiatric/Behavioral: Negative.        Date of Birth 2000  Past Medical History:   Diagnosis Date    Essential hypertension 10/19/2023    History of migraine headaches 10/19/2023     Past Surgical History:   Procedure Laterality Date    ARM SURGERY Right 2007    right arm both bones broken just above wrist    SEPTOPLASTY  2016    TONSILLECTOMY N/A 2022    TONSILLECTOMY performed by Magdalene Sow DO at [facility]     OB History    Para Term  AB Living   0 0 0 0 0 0   SAB IAB Ectopic Molar Multiple Live Births   0 0 0 0 0 0     Social History     Socioeconomic History    Marital status: Single     Spouse name: Not on file    Number of children: Not on file    Years of education: Not on file    Highest education level: Not on file   Occupational History    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[employer]   Tobacco Use    Smoking status: Never    Smokeless tobacco: Never   Vaping Use    Vaping Use: Never used   Substance and Sexual Activity    Alcohol use: Yes     Comment: 3-5 drinks per weeks    Drug use: Never    Sexual activity: Yes     Partners: Male   Other Topics Concern    Not on file   Social History Narrative    Not on file     Social Determinants of Health     Financial Resource Strain: Low Risk  (10/19/2023)    Overall Financial Resource Strain (CARDIA)     Difficulty of Paying Living Expenses: Not hard at all   Food Insecurity: No Food Insecurity (10/19/2023)    Hunger Vital Sign     Worried About Running Out

## 2023-12-11 ENCOUNTER — PROCEDURE VISIT (OUTPATIENT)
Dept: GYNECOLOGY | Age: 23
End: 2023-12-11

## 2023-12-11 VITALS
WEIGHT: 121 LBS | DIASTOLIC BLOOD PRESSURE: 64 MMHG | SYSTOLIC BLOOD PRESSURE: 124 MMHG | BODY MASS INDEX: 24.39 KG/M2 | HEIGHT: 59 IN | OXYGEN SATURATION: 96 % | HEART RATE: 80 BPM

## 2023-12-11 DIAGNOSIS — N87.0 MILD DYSPLASIA OF CERVIX: Primary | ICD-10-CM

## 2023-12-12 NOTE — PROGRESS NOTES
Patient is here for colposcopy. Patient with mild dysplasia.      Review of Systems: as above  General ROS: negative  Psychological ROS: negative  Ophthalmic ROS: negative  ENT ROS: negative  Allergy and Immunology ROS: negative  Hematological and Lymphatic ROS: negative  Endocrine ROS: negative  Breast ROS: negative  Respiratory ROS: negative  Cardiovascular ROS: negative  Gastrointestinal ROS: negative  Genito-Urinary ROS: as above  Musculoskeletal ROS: negative  Neurological ROS: negative  Dermatological ROS: negative     Date of Birth 2000  Past Medical History:   Diagnosis Date    Essential hypertension 10/19/2023    History of migraine headaches 10/19/2023     Past Surgical History:   Procedure Laterality Date    ARM SURGERY Right 2007    right arm both bones broken just above wrist    SEPTOPLASTY  2016    TONSILLECTOMY N/A 2022    TONSILLECTOMY performed by Devante Osorio DO at [facility]     OB History    Para Term  AB Living   0 0 0 0 0 0   SAB IAB Ectopic Molar Multiple Live Births   0 0 0 0 0 0     Social History     Socioeconomic History    Marital status: Single     Spouse name: Not on file    Number of children: Not on file    Years of education: Not on file    Highest education level: Not on file   Occupational History    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[employer]   Tobacco Use    Smoking status: Never    Smokeless tobacco: Never   Vaping Use    Vaping Use: Never used   Substance and Sexual Activity    Alcohol use: Yes     Comment: 3-5 drinks per weeks    Drug use: Never    Sexual activity: Yes     Partners: Male   Other Topics Concern    Not on file   Social History Narrative    Not on file     Social Determinants of Health     Financial Resource Strain: Low Risk  (10/19/2023)    Overall Financial Resource Strain (CARDIA)     Difficulty of Paying Living Expenses: Not hard at all   Food Insecurity: Not on file (10/19/2023)   Transportation Needs: Unknown (10/19/2023)    PRAPARE -

## 2024-03-25 ASSESSMENT — PATIENT HEALTH QUESTIONNAIRE - PHQ9
SUM OF ALL RESPONSES TO PHQ9 QUESTIONS 1 & 2: 0
SUM OF ALL RESPONSES TO PHQ QUESTIONS 1-9: 0
1. LITTLE INTEREST OR PLEASURE IN DOING THINGS: NOT AT ALL
1. LITTLE INTEREST OR PLEASURE IN DOING THINGS: NOT AT ALL
SUM OF ALL RESPONSES TO PHQ QUESTIONS 1-9: 0
2. FEELING DOWN, DEPRESSED OR HOPELESS: NOT AT ALL
2. FEELING DOWN, DEPRESSED OR HOPELESS: NOT AT ALL
SUM OF ALL RESPONSES TO PHQ9 QUESTIONS 1 & 2: 0
SUM OF ALL RESPONSES TO PHQ QUESTIONS 1-9: 0
SUM OF ALL RESPONSES TO PHQ QUESTIONS 1-9: 0

## 2024-03-26 ENCOUNTER — OFFICE VISIT (OUTPATIENT)
Dept: PRIMARY CARE CLINIC | Age: 24
End: 2024-03-26

## 2024-03-26 VITALS
HEART RATE: 87 BPM | SYSTOLIC BLOOD PRESSURE: 122 MMHG | TEMPERATURE: 98 F | DIASTOLIC BLOOD PRESSURE: 84 MMHG | BODY MASS INDEX: 25.29 KG/M2 | WEIGHT: 125.2 LBS | OXYGEN SATURATION: 98 %

## 2024-03-26 DIAGNOSIS — R12 HEARTBURN: Primary | ICD-10-CM

## 2024-03-26 DIAGNOSIS — Z86.79 HISTORY OF HYPERTENSION: ICD-10-CM

## 2024-03-26 DIAGNOSIS — F41.9 ANXIETY: ICD-10-CM

## 2024-03-26 DIAGNOSIS — Z86.69 HISTORY OF MIGRAINE HEADACHES: ICD-10-CM

## 2024-03-26 PROBLEM — Z87.42 HISTORY OF ABNORMAL CERVICAL PAP SMEAR: Status: ACTIVE | Noted: 2024-03-26

## 2024-03-26 PROCEDURE — 99214 OFFICE O/P EST MOD 30 MIN: CPT | Performed by: FAMILY MEDICINE

## 2024-03-26 RX ORDER — PANTOPRAZOLE SODIUM 40 MG/1
TABLET, DELAYED RELEASE ORAL
Qty: 60 TABLET | Refills: 3 | Status: SHIPPED | OUTPATIENT
Start: 2024-03-26

## 2024-03-26 NOTE — PATIENT INSTRUCTIONS
[facility] Locations - No appointment necessary.  ? indicates the location is open Saturdays in addition to Monday through Friday.   Call your preferred location for test preparation, business hours and other information you need.   [facility] accepts all insurances.  CENTRAL  EAST  [place]    ? Carlos   [address]   [address]   [place] [address]    Ph: [phone]  [facility]   [address] [place] [address]    Ph: [phone]   ? Yayo   [address]    [place] [address]    Ph: [phone]     [facility]   [address] [place] [address]    Ph: [phone] ? [address] [place] [address]   Ph: [phone]  ? [facility]   [address] [place] [place] [address]    Ph: [phone]      Jc   [address] [place] [address]   Ph: [phone]    NORTH    ? [facility]   [address] [place] [address]    Ph: [phone]  [facility]   [address] [place] [address]   Ph: [phone]  [place]   [address], [address]    PH: [phone]    [facility]   [address] [place] [address]    Ph: [phone]  [place]  [address] [place] [address]  Ph: [phone]  [facility]   [address] [place] [address]    Ph: [phone]

## 2024-03-26 NOTE — PROGRESS NOTES
[facility]  [facility] PRIMARY CARE  [address] [place] [address]  Dept: [phone]  Dept Fax: [phone]     3/26/2024      Ursula Pinzon   2000     Chief Complaint   Patient presents with    Hypertension     Stopped BP 3 weeks ago.  A lot of burping, tried pepcid and tums not much help       HPI  Pt comes in today for f/u. Reports being on Propranolol about 1.5 years from Neuro. She stopped it a few weeks ago because she felt like she was anxious on this. When she stopped this those symptoms improved. She also felt like she was getting some breathlessness and chest tightness. With this stop in med, all home BP readings normal. Has been trying OTC meds for upper GI symptoms, but still a lot of burping. Does feel like some GERD symptoms as well. Migraines stable since stopping Propranolol. Does feel that anxiety is worsening.         3/25/2024     8:32 AM 10/19/2023     9:01 AM 1/18/2022     4:38 PM   PHQ Scores   PHQ2 Score 0 0 0   PHQ9 Score 0 0 0     Interpretation of Total Score Depression Severity: 1-4 = Minimal depression, 5-9 = Mild depression, 10-14 = Moderate depression, 15-19 = Moderately severe depression, 20-27 = Severe depression     Prior to Visit Medications    Medication Sig Taking? Authorizing Provider   vitamin D 25 MCG (1000 UT) CAPS Take 1 capsule by mouth daily Yes Gris Portillo MD   propranolol (INDERAL LA) 60 MG extended release capsule Take by mouth daily No Gris Portillo MD   rizatriptan (MAXALT) 10 MG tablet Take 1 tablet by mouth once as needed for Migraine May repeat in 2 hours if needed Yes Gris Portillo MD       Past Medical History:   Diagnosis Date    History of hypertension 03/26/2024    History of migraine headaches 10/19/2023        Social History     Tobacco Use    Smoking status: Never    Smokeless tobacco: Never   Vaping Use    Vaping Use: Never used   Substance Use Topics    Alcohol use: Yes     Comment: 3-5

## 2024-04-11 ENCOUNTER — TELEMEDICINE (OUTPATIENT)
Age: 24
End: 2024-04-11

## 2024-04-11 DIAGNOSIS — F41.1 GENERALIZED ANXIETY DISORDER: Primary | ICD-10-CM

## 2024-04-11 PROCEDURE — 90791 PSYCH DIAGNOSTIC EVALUATION: CPT | Performed by: PSYCHOLOGIST

## 2024-04-11 NOTE — PROGRESS NOTES
Behavioral Health Consultation  Maryuri Lott, Ph.D.  Clinical Psychologist  4/11/2024  9:00 AM  Name: Ursula Pinzon  YOB: 2000  PCP: Tray Gonsalves DO     TELEHEALTH VISIT -- Audio/Visual (During COVID-19 public health emergency)  Time spent with Ursula: 30 minutes  This is her first [facility] appointment.  Reason for Consult:  Anxiety     She provided informed consent for the behavioral health services. Discussed with her model of service to include the limits of confidentiality (i.e. abuse reporting, suicide intervention, etc.) and short-term intervention focused approach. She indicated understanding.     S:  Ursula is a 24 y.o. female seen per Tray Gonsalves DO addressing anxiety.  She describes symptoms consistent with diagnosis of Generalized Anxiety Disorder.  She describes noticing anxiety symptoms \"off and on\" for a couple of years, more persistent in the last year.  Describes clear precipitating factors.  She has been in a long distance relationship for a year, some contemplation of which of them might move to the other's location.  She has a career in [place] she really likes.    Current Anxiety symptoms include excessive worry thoughts/apprehension which is difficult to control, restlessness/on edge, easily fatigued, difficulty concentrating/losing train of thought, irritability, muscle tension, difficulty falling asleep (worry impacts patient's sleep), and GI distress during times of worry/stress.  Denies notable avoidance but is having difficult to motivate self lately for things like dishes; slight reduction in social activities.  Noticing a little more tearfulness as well.  Recent increase in heartburn.  Hx of migraines but better controlled recently.      Currently coping by trying to talk herself through it using calming self-talk;  trying a sound machine at night.  Ursula is not taking medication.  Past treatment history includes: No past therapy, medication,

## 2024-05-02 ENCOUNTER — TELEMEDICINE (OUTPATIENT)
Age: 24
End: 2024-05-02
Payer: COMMERCIAL

## 2024-05-02 DIAGNOSIS — F32.1 CURRENT MODERATE EPISODE OF MAJOR DEPRESSIVE DISORDER WITHOUT PRIOR EPISODE (HCC): ICD-10-CM

## 2024-05-02 DIAGNOSIS — F41.1 GENERALIZED ANXIETY DISORDER: Primary | ICD-10-CM

## 2024-05-02 PROBLEM — F32.9 MDD (MAJOR DEPRESSIVE DISORDER), SINGLE EPISODE: Status: ACTIVE | Noted: 2024-05-02

## 2024-05-02 PROCEDURE — 90832 PSYTX W PT 30 MINUTES: CPT | Performed by: PSYCHOLOGIST

## 2024-06-17 ENCOUNTER — OFFICE VISIT (OUTPATIENT)
Dept: GYNECOLOGY | Age: 24
End: 2024-06-17
Payer: COMMERCIAL

## 2024-06-17 VITALS
HEIGHT: 59 IN | DIASTOLIC BLOOD PRESSURE: 80 MMHG | SYSTOLIC BLOOD PRESSURE: 120 MMHG | BODY MASS INDEX: 24.6 KG/M2 | HEART RATE: 105 BPM | WEIGHT: 122 LBS | OXYGEN SATURATION: 97 % | RESPIRATION RATE: 17 BRPM

## 2024-06-17 DIAGNOSIS — N87.0 MILD DYSPLASIA OF CERVIX: Primary | ICD-10-CM

## 2024-06-17 PROCEDURE — 99213 OFFICE O/P EST LOW 20 MIN: CPT | Performed by: OBSTETRICS & GYNECOLOGY

## 2024-06-18 ASSESSMENT — ENCOUNTER SYMPTOMS
ALLERGIC/IMMUNOLOGIC NEGATIVE: 1
EYES NEGATIVE: 1
GASTROINTESTINAL NEGATIVE: 1
RESPIRATORY NEGATIVE: 1

## 2024-06-19 NOTE — PROGRESS NOTES
11\")   Wt 55.3 kg (122 lb)   SpO2 97%   BMI 24.64 kg/m²       Prior to Visit Medications    Medication Sig Taking? Authorizing Provider   pantoprazole (PROTONIX) 40 MG tablet Take 1 tablet twice daily. If improved after 1 month, return to once daily prn Yes Tray Gonsalves DO   vitamin D 25 MCG (1000 UT) CAPS Take 1 capsule by mouth daily Yes ProviderGris MD   rizatriptan (MAXALT) 10 MG tablet Take 1 tablet by mouth once as needed for Migraine May repeat in 2 hours if needed Yes Provider, MD Gris        No Known Allergies    Past Medical History:   Diagnosis Date    History of hypertension 03/26/2024    History of migraine headaches 10/19/2023       Past Surgical History:   Procedure Laterality Date    ARM SURGERY Right 2007    right arm both bones broken just above wrist    SEPTOPLASTY  2016    TONSILLECTOMY N/A 09/12/2022    TONSILLECTOMY performed by Slim Casarez DO at [facility] OR       Social History     Socioeconomic History    Marital status: Single     Spouse name: Not on file    Number of children: Not on file    Years of education: Not on file    Highest education level: Not on file   Occupational History    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[employer]   Tobacco Use    Smoking status: Never    Smokeless tobacco: Never   Vaping Use    Vaping Use: Never used   Substance and Sexual Activity    Alcohol use: Yes     Comment: 3-5 drinks per weeks    Drug use: Never    Sexual activity: Yes     Partners: Male   Other Topics Concern    Not on file   Social History Narrative    Not on file     Social Determinants of Health     Financial Resource Strain: Low Risk  (10/19/2023)    Overall Financial Resource Strain (CARDIA)     Difficulty of Paying Living Expenses: Not hard at all   Food Insecurity: Not on file (10/19/2023)   Transportation Needs: Unknown (10/19/2023)    PRAPARE - Transportation     Lack of Transportation (Medical): Not on file     Lack of Transportation (Non-Medical): No

## 2024-10-21 SDOH — ECONOMIC STABILITY: TRANSPORTATION INSECURITY
IN THE PAST 12 MONTHS, HAS LACK OF TRANSPORTATION KEPT YOU FROM MEETINGS, WORK, OR FROM GETTING THINGS NEEDED FOR DAILY LIVING?: NO

## 2024-10-21 SDOH — ECONOMIC STABILITY: FOOD INSECURITY: WITHIN THE PAST 12 MONTHS, THE FOOD YOU BOUGHT JUST DIDN'T LAST AND YOU DIDN'T HAVE MONEY TO GET MORE.: NEVER TRUE

## 2024-10-21 SDOH — ECONOMIC STABILITY: FOOD INSECURITY: WITHIN THE PAST 12 MONTHS, YOU WORRIED THAT YOUR FOOD WOULD RUN OUT BEFORE YOU GOT MONEY TO BUY MORE.: NEVER TRUE

## 2024-10-21 SDOH — ECONOMIC STABILITY: INCOME INSECURITY: HOW HARD IS IT FOR YOU TO PAY FOR THE VERY BASICS LIKE FOOD, HOUSING, MEDICAL CARE, AND HEATING?: NOT HARD AT ALL

## 2024-10-24 ENCOUNTER — OFFICE VISIT (OUTPATIENT)
Dept: PRIMARY CARE CLINIC | Age: 24
End: 2024-10-24

## 2024-10-24 VITALS
TEMPERATURE: 98.4 F | HEIGHT: 59 IN | SYSTOLIC BLOOD PRESSURE: 132 MMHG | HEART RATE: 88 BPM | DIASTOLIC BLOOD PRESSURE: 87 MMHG | BODY MASS INDEX: 25 KG/M2 | OXYGEN SATURATION: 98 % | WEIGHT: 124 LBS

## 2024-10-24 DIAGNOSIS — F41.1 GENERALIZED ANXIETY DISORDER: ICD-10-CM

## 2024-10-24 DIAGNOSIS — R14.0 ABDOMINAL DISTENTION: ICD-10-CM

## 2024-10-24 DIAGNOSIS — Z83.49 FAMILY HISTORY OF THYROID DISEASE: ICD-10-CM

## 2024-10-24 DIAGNOSIS — K58.2 IRRITABLE BOWEL SYNDROME WITH BOTH CONSTIPATION AND DIARRHEA: ICD-10-CM

## 2024-10-24 DIAGNOSIS — Z00.00 ANNUAL PHYSICAL EXAM: Primary | ICD-10-CM

## 2024-10-24 DIAGNOSIS — L65.9 HAIR THINNING: ICD-10-CM

## 2024-10-24 ASSESSMENT — ENCOUNTER SYMPTOMS
VOMITING: 0
COUGH: 0
NAUSEA: 0
ABDOMINAL DISTENTION: 1
DIARRHEA: 0
SHORTNESS OF BREATH: 0
ABDOMINAL PAIN: 0
WHEEZING: 0
EYE ITCHING: 0
EYE PAIN: 0
SORE THROAT: 0
CONSTIPATION: 0

## 2025-04-16 ENCOUNTER — OFFICE VISIT (OUTPATIENT)
Dept: GYNECOLOGY | Age: 25
End: 2025-04-16
Payer: COMMERCIAL

## 2025-04-16 VITALS
HEIGHT: 59 IN | OXYGEN SATURATION: 97 % | DIASTOLIC BLOOD PRESSURE: 80 MMHG | SYSTOLIC BLOOD PRESSURE: 124 MMHG | HEART RATE: 96 BPM | BODY MASS INDEX: 25.69 KG/M2 | WEIGHT: 127.43 LBS | RESPIRATION RATE: 17 BRPM

## 2025-04-16 DIAGNOSIS — D22.9 CHANGE IN MOLE: ICD-10-CM

## 2025-04-16 DIAGNOSIS — Z01.419 WELL WOMAN EXAM WITH ROUTINE GYNECOLOGICAL EXAM: Primary | ICD-10-CM

## 2025-04-16 DIAGNOSIS — N94.6 DYSMENORRHEA: ICD-10-CM

## 2025-04-16 PROCEDURE — 99395 PREV VISIT EST AGE 18-39: CPT | Performed by: OBSTETRICS & GYNECOLOGY

## 2025-04-16 RX ORDER — DESOGESTREL AND ETHINYL ESTRADIOL 21-5 (28)
1 KIT ORAL DAILY
Qty: 3 PACKET | Refills: 3 | Status: SHIPPED | OUTPATIENT
Start: 2025-04-16

## 2025-04-16 ASSESSMENT — ENCOUNTER SYMPTOMS
RESPIRATORY NEGATIVE: 1
EYES NEGATIVE: 1
ALLERGIC/IMMUNOLOGIC NEGATIVE: 1
GASTROINTESTINAL NEGATIVE: 1

## 2025-04-17 NOTE — PROGRESS NOTES
Subjective   Patient ID: Ursula Pinzon is a 25 y.o. female.    Patient is here for annual. Patient wants to start birth control again due to dysmenorrhea. Patient with concern about mole under left breast.    Gynecologic Exam        Review of Systems   Constitutional: Negative.    HENT: Negative.     Eyes: Negative.    Respiratory: Negative.     Cardiovascular: Negative.    Gastrointestinal: Negative.    Endocrine: Negative.    Genitourinary:  Positive for menstrual problem and pelvic pain.   Musculoskeletal: Negative.    Skin: Negative.    Allergic/Immunologic: Negative.    Neurological: Negative.    Hematological: Negative.    Psychiatric/Behavioral: Negative.       Date of Birth 2000  Past Medical History:   Diagnosis Date    Generalized anxiety disorder 2024    History of hypertension 2024    History of migraine headaches 10/19/2023     Past Surgical History:   Procedure Laterality Date    ARM SURGERY Right 2007    right arm both bones broken just above wrist    SEPTOPLASTY  2016    TONSILLECTOMY N/A 2022    TONSILLECTOMY performed by Slim Casarez, DO at [facility] OR     OB History    Para Term  AB Living   0 0 0 0 0 0   SAB IAB Ectopic Molar Multiple Live Births   0 0 0 0 0 0     Social History     Socioeconomic History    Marital status: Single     Spouse name: Not on file    Number of children: Not on file    Years of education: Not on file    Highest education level: Not on file   Occupational History    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

## 2025-04-28 LAB
HPV HR 12 DNA SPEC QL NAA+PROBE: DETECTED
HPV16 DNA SPEC QL NAA+PROBE: NOT DETECTED
HPV16+18+H RISK 12 DNA SPEC-IMP: ABNORMAL
HPV18 DNA SPEC QL NAA+PROBE: NOT DETECTED

## 2025-04-30 ENCOUNTER — RESULTS FOLLOW-UP (OUTPATIENT)
Dept: GYNECOLOGY | Age: 25
End: 2025-04-30

## 2025-07-08 DIAGNOSIS — N94.6 DYSMENORRHEA: ICD-10-CM

## 2025-07-09 RX ORDER — DESOGESTREL AND ETHINYL ESTRADIOL 21-5 (28)
1 KIT ORAL DAILY
Qty: 3 PACKET | Refills: 3 | Status: SHIPPED | OUTPATIENT
Start: 2025-07-09

## (undated) DEVICE — ELECTROSURGICAL SUCTION COAGULATOR, 10FR: Brand: CONMED

## (undated) DEVICE — SOLUTION IV 1000ML 0.9% SOD CHL

## (undated) DEVICE — ENT MINOR: Brand: MEDLINE INDUSTRIES, INC.

## (undated) DEVICE — TOWEL,STOP FLAG GOLD N-W: Brand: MEDLINE

## (undated) DEVICE — NEEDLE HYPO 25GA L1.5IN BLU POLYPR HUB S STL REG BVL STR

## (undated) DEVICE — KIT,ANTI FOG,W/SPONGE & FLUID,SOFT PACK: Brand: MEDLINE

## (undated) DEVICE — ELECTRODE PT RET AD L9FT HI MOIST COND ADH HYDRGEL CORDED

## (undated) DEVICE — PENCIL ELECSURG HND CTRL ALL IN 1 MONOPOLAR LAP

## (undated) DEVICE — TOWEL,OR,DSP,ST,BLUE,DLX,8/PK,10PK/CS: Brand: MEDLINE

## (undated) DEVICE — SPONGE,TONSIL,DBL STRNG,XRAY,MED,1",STRL: Brand: MEDLINE INDUSTRIES, INC.

## (undated) DEVICE — GLOVE ORANGE PI 7 1/2   MSG9075

## (undated) DEVICE — BLADE ES ELASTOMERIC COAT INSUL DURABLE BEND UPTO 90DEG